# Patient Record
Sex: FEMALE | Race: WHITE | NOT HISPANIC OR LATINO | Employment: FULL TIME | URBAN - METROPOLITAN AREA
[De-identification: names, ages, dates, MRNs, and addresses within clinical notes are randomized per-mention and may not be internally consistent; named-entity substitution may affect disease eponyms.]

---

## 2017-02-27 ENCOUNTER — RADIANT APPOINTMENT (OUTPATIENT)
Dept: MAMMOGRAPHY | Facility: CLINIC | Age: 57
End: 2017-02-27
Payer: COMMERCIAL

## 2017-02-27 ENCOUNTER — OFFICE VISIT (OUTPATIENT)
Dept: OBGYN | Facility: CLINIC | Age: 57
End: 2017-02-27
Payer: COMMERCIAL

## 2017-02-27 VITALS
WEIGHT: 222 LBS | DIASTOLIC BLOOD PRESSURE: 72 MMHG | HEIGHT: 62 IN | SYSTOLIC BLOOD PRESSURE: 120 MMHG | BODY MASS INDEX: 40.85 KG/M2 | HEART RATE: 80 BPM

## 2017-02-27 DIAGNOSIS — Z01.419 ENCOUNTER FOR GYNECOLOGICAL EXAMINATION WITHOUT ABNORMAL FINDING: Primary | ICD-10-CM

## 2017-02-27 DIAGNOSIS — Z12.31 VISIT FOR SCREENING MAMMOGRAM: ICD-10-CM

## 2017-02-27 PROCEDURE — 99396 PREV VISIT EST AGE 40-64: CPT | Performed by: OBSTETRICS & GYNECOLOGY

## 2017-02-27 PROCEDURE — G0202 SCR MAMMO BI INCL CAD: HCPCS | Mod: TC

## 2017-02-27 PROCEDURE — G0145 SCR C/V CYTO,THINLAYER,RESCR: HCPCS | Performed by: OBSTETRICS & GYNECOLOGY

## 2017-02-27 RX ORDER — FUROSEMIDE 20 MG
20 TABLET ORAL
COMMUNITY
Start: 2017-02-07

## 2017-02-27 RX ORDER — METOPROLOL SUCCINATE 50 MG/1
50 TABLET, EXTENDED RELEASE ORAL
COMMUNITY
Start: 2016-07-29 | End: 2019-04-01

## 2017-02-27 RX ORDER — LORATADINE 10 MG/1
10 TABLET ORAL
COMMUNITY

## 2017-02-27 ASSESSMENT — ANXIETY QUESTIONNAIRES
5. BEING SO RESTLESS THAT IT IS HARD TO SIT STILL: SEVERAL DAYS
2. NOT BEING ABLE TO STOP OR CONTROL WORRYING: SEVERAL DAYS
3. WORRYING TOO MUCH ABOUT DIFFERENT THINGS: SEVERAL DAYS
GAD7 TOTAL SCORE: 5
6. BECOMING EASILY ANNOYED OR IRRITABLE: NOT AT ALL
1. FEELING NERVOUS, ANXIOUS, OR ON EDGE: SEVERAL DAYS
IF YOU CHECKED OFF ANY PROBLEMS ON THIS QUESTIONNAIRE, HOW DIFFICULT HAVE THESE PROBLEMS MADE IT FOR YOU TO DO YOUR WORK, TAKE CARE OF THINGS AT HOME, OR GET ALONG WITH OTHER PEOPLE: SOMEWHAT DIFFICULT
7. FEELING AFRAID AS IF SOMETHING AWFUL MIGHT HAPPEN: NOT AT ALL

## 2017-02-27 ASSESSMENT — PATIENT HEALTH QUESTIONNAIRE - PHQ9: 5. POOR APPETITE OR OVEREATING: SEVERAL DAYS

## 2017-02-27 NOTE — MR AVS SNAPSHOT
"              After Visit Summary   2017    Wendy Goltzman    MRN: [id number]           Patient Information     Date Of Birth          1960        Visit Information        Provider Department      2017 1:00 PM Perry Urbina MD [facility]        Today's Diagnoses     Encounter for gynecological examination without abnormal finding    -  1       Follow-ups after your visit        Who to contact     If you have questions or need follow up information about today's clinic visit or your schedule please contact [facility] directly at [phone].  Normal or non-critical lab and imaging results will be communicated to you by [employer], letter or phone within 4 business days after the clinic has received the results. If you do not hear from us within 7 days, please contact the clinic through [employer] or phone. If you have a critical or abnormal lab result, we will notify you by phone as soon as possible.  Submit refill requests through [employer] or call your pharmacy and they will forward the refill request to us. Please allow 3 business days for your refill to be completed.          Additional Information About Your Visit        MyChart Information     [employer] lets you send messages to your doctor, view your test results, renew your prescriptions, schedule appointments and more. To sign up, go to [email or web address] . Click on \"Log in\" on the left side of the screen, which will take you to the Welcome page. Then click on \"Sign up Now\" on the right side of the page.     You will be asked to enter the access code listed below, as well as some personal information. Please follow the directions to create your username and password.     Your access code is: [email or web address]  Expires: 2017  1:50 PM     Your access code will  in 90 days. If you need help or a new code, please call your [place] clinic or [phone].        Care EveryWhere ID     This is your " "Care EveryWhere ID. This could be used by other organizations to access your [place] medical records  [id number]        Your Vitals Were     Pulse Height Last Period Breastfeeding? BMI (Body Mass Index)       80 1.575 m (5' 2\") (LMP Unknown) No 40.6 kg/m2        Blood Pressure from Last 3 Encounters:   02/27/17 120/72   02/25/16 112/70   12/01/15 112/60    Weight from Last 3 Encounters:   02/27/17 100.7 kg (222 lb)   02/25/16 99.3 kg (219 lb)   12/01/15 102.8 kg (226 lb 9.6 oz)              We Performed the Following     Pap imaged thin layer screen reflex to HPV if ASCUS - recommended age 25 - 29 years        Primary Care Provider Office Phone # Fax #    Sherman Diaz [phone]       [place]  [facility] [address]        Thank you!     Thank you for choosing [facility] [place]  for your care. Our goal is always to provide you with excellent care. Hearing back from our patients is one way we can continue to improve our services. Please take a few minutes to complete the written survey that you may receive in the mail after your visit with us. Thank you!             Your Updated Medication List - Protect others around you: Learn how to safely use, store and throw away your medicines at www.disposemymeds.org.          This list is accurate as of: 2/27/17  1:50 PM.  Always use your most recent med list.                   Brand Name Dispense Instructions for use    furosemide 20 MG tablet    LASIX     Take 20 mg by mouth       ibuprofen 600 MG tablet    ADVIL/MOTRIN    30 tablet    Take 1 tablet (600 mg) by mouth every 6 hours as needed for pain       loratadine 10 MG tablet    CLARITIN     Take 10 mg by mouth       metoprolol 50 MG 24 hr tablet    TOPROL-XL     Take 50 mg by mouth       ZANTAC PO            "

## 2017-02-27 NOTE — PROGRESS NOTES
Brigida is a 56 year old  female who presents for annual exam.     Besides routine health maintenance,  she would like to discuss hormones.    HPI: The patient was seen at this time for her annual exam. She is very discouraged that she has regained all of the weight that she had lost. She is menopausal.  The patient's PCP is Sherman Diaz MD.        GYNECOLOGIC HISTORY:    No LMP recorded (lmp unknown). Patient is postmenopausal.  Her current contraception method is: menopause.  She  reports that she has never smoked. She does not have any smokeless tobacco history on file.    Patient is sexually active.  STD testing offered?  Declined  Last PHQ-9 score on record =   PHQ-9 SCORE 2017   Total Score 13     Last GAD7 score on record =   PILO-7 SCORE 2017   Total Score 5     Alcohol Score = 1    HEALTH MAINTENANCE:  Cholesterol: (No results found for: CHOL   Last Mammo: one year ago, Result: normal, Next Mammo: today   Pap: (  Lab Results   Component Value Date    PAP NIL 2016    PAP NIL 2015    )   Colonoscopy:  2012, Result: normal, Next Colonoscopy: 5 years.  Dexa:  2014    Health maintenance updated:  yes    HISTORY:  Obstetric History       T2      TAB0   SAB0   E0   M0   L0       # Outcome Date GA Lbr Rosendo/2nd Weight Sex Delivery Anes PTL Lv   2 Term            1 Term                   Patient Active Problem List   Diagnosis     Abnormal glandular Papanicolaou smear of cervix     Past Surgical History   Procedure Laterality Date     Laminectomy cervical minimally invasive one level       Mammoplasty reduction bilateral  2004     Tonsillectomy        Social History   Substance Use Topics     Smoking status: Never Smoker     Smokeless tobacco: Not on file     Alcohol use No      Problem (# of Occurrences) Relation (Name,Age of Onset)    Myocardial Infarction (1) Father: x2       Negative family history of: DIABETES            Current Outpatient Prescriptions  "  Medication Sig     metoprolol (TOPROL-XL) 50 MG 24 hr tablet Take 50 mg by mouth     furosemide (LASIX) 20 MG tablet Take 20 mg by mouth     Ranitidine HCl (ZANTAC PO)      loratadine (CLARITIN) 10 MG tablet Take 10 mg by mouth     ibuprofen (ADVIL,MOTRIN) 600 MG tablet Take 1 tablet (600 mg) by mouth every 6 hours as needed for pain     No current facility-administered medications for this visit.      Allergies   Allergen Reactions     Sulfa Drugs        Past medical, surgical, social and family histories were reviewed and updated in EPIC.    ROS:   12 point review of systems negative other than symptoms noted below.  Constitutional: Fatigue and Weight Gain  Eyes: Spots  Head: Ringing  Cardiovascular: Lower Extremity Swelling  Gastrointestinal: Bloating and Heartburn  Genitourinary: No Periods and Urgency  Skin: Rash  Musculoskeletal: Muscle Cramps  Endocrine: Cold Intolerance  Psychiatric: Depression    EXAM:  /72  Pulse 80  Ht 5' 2\" (1.575 m)  Wt 222 lb (100.7 kg)  LMP  (LMP Unknown)  Breastfeeding? No  BMI 40.6 kg/m2   BMI: Body mass index is 40.6 kg/(m^2).    PHYSICAL EXAM:  Constitutional:  Appearance: Well nourished, well developed, alert, in no acute distress  Neck:  Lymph Nodes:  No lymphadenopathy present    Thyroid:  Gland size normal, nontender, no nodules or masses present  on palpation  Chest:  Respiratory Effort:  Breathing unlabored  Cardiovascular:    Heart: Auscultation:  Regular rate, normal rhythm, no murmurs present  Breasts: Inspection of Breasts:  No lymphadenopathy present    Palpation of Breasts and Axillae:  No masses present on palpation, no  breast tenderness    Axillary Lymph Nodes:  No lymphadenopathy present  Gastrointestinal:   Abdominal Examination:  Abdomen nontender to palpation, tone normal without rigidity or guarding, no masses present, umbilicus without lesions   Liver and Spleen:  No hepatomegaly present, liver nontender to palpation    Hernias:  No hernias " present  Lymphatic: Lymph Nodes:  No other lymphadenopathy present  Skin:  General Inspection:  No rashes present, no lesions present, no areas of  discoloration    Genitalia and Groin:  No rashes present, no lesions present, no areas of  discoloration, no masses present  Neurologic/Psychiatric:    Mental Status:  Oriented X3     Pelvic Exam:  External Genitalia:     Normal appearance for age, no discharge present, no tenderness present, no inflammatory lesions present, color normal  Vagina:     Normal vaginal vault without central or paravaginal defects, no discharge present, no inflammatory lesions present, no masses present  Bladder:     Nontender to palpation  Urethra:   Urethral Body:  Urethra palpation normal, urethra structural support normal   Urethral Meatus:  No erythema or lesions present  Cervix:     Appearance healthy, no lesions present, nontender to palpation, no bleeding present  Uterus:     Nontender to palpation, no masses present, position anteflexed, mobility: normal  Adnexa:     No adnexal tenderness present, no adnexal masses present  Perineum:     Perineum within normal limits, no evidence of trauma, no rashes or skin lesions present  Anus:     Anus within normal limits, no hemorrhoids present  Inguinal Lymph Nodes:     No lymphadenopathy present  Pubic Hair:     Normal pubic hair distribution for age  Genitalia and Groin:     No rashes present, no lesions present, no areas of discoloration, no masses present    COUNSELING:   Reviewed preventive health counseling, as reflected in patient instructions       Regular exercise       Healthy diet/nutrition    BMI: Body mass index is 40.6 kg/(m^2).  Weight management plan: Discussed healthy diet and exercise guidelines and patient will follow up in 6 months in clinic to re-evaluate.    ASSESSMENT:  56 year old female with satisfactory annual exam.    ICD-10-CM    1. Encounter for gynecological examination without abnormal finding Z01.419         PLAN: Patient's annual examination is adequate. She needs to drop at least 50 pounds.      Perry Urbina MD

## 2017-02-27 NOTE — LETTER
[facility]  [address] , [address]  [place]   [address] [phone]      3/1/2017     Wendy Goltzman   [address] [address]      Dear Brigida,  We are happy to inform you that your PAP smear result is normal.  We are now able to do a follow up test on PAP smears. The DNA test is for HPV (Human Papilloma Virus). Cervical cancer is closely linked with certain types of HPV. Your result showed no evidence of HPV.  Therefore we recommend you return in 1 year for your next pap smear.  You will still need to return to the clinic every year for an annual exam and other preventive tests.  Please contact the clinic with any questions.  Sincerely,    Perry Perez MD

## 2017-02-28 ASSESSMENT — ANXIETY QUESTIONNAIRES: GAD7 TOTAL SCORE: 5

## 2017-02-28 ASSESSMENT — PATIENT HEALTH QUESTIONNAIRE - PHQ9: SUM OF ALL RESPONSES TO PHQ QUESTIONS 1-9: 13

## 2017-03-01 LAB
COPATH REPORT: NORMAL
PAP: NORMAL

## 2017-03-31 ENCOUNTER — TELEPHONE (OUTPATIENT)
Dept: OBGYN | Facility: CLINIC | Age: 57
End: 2017-03-31

## 2017-03-31 NOTE — TELEPHONE ENCOUNTER
LM informing pt if she has not had a period on over a year and experience vaginal bleeding she should call back to schedule an appt. Otherwise pt should call to discuss symptoms with triage nurse in further detail.

## 2017-03-31 NOTE — TELEPHONE ENCOUNTER
Patient has unexpected bleeding in started on 03/20/17 ended on 03/25/17. Patient hasn't had this for over year and would like to speak to a nurse.

## 2017-04-03 ENCOUNTER — TELEPHONE (OUTPATIENT)
Dept: NURSING | Facility: CLINIC | Age: 57
End: 2017-04-03

## 2017-04-03 NOTE — TELEPHONE ENCOUNTER
"Pt calling before her annual on 2/25/16 it had been a year since her last period and on 3/20/17 she had another period but it didn't last as long and did have some cramping, passed small, about pea size blood clots. Three wks before the period at the end of March she had \"felt odd\" in the pelvic area and when she urinated thought something solid was coming out of bladder but with the period that she had now thinks that it was vaginally. No history of fibroid and polyps, thinks when really young had a cyst. In between that timeframe of her no period time frame, she had brown/old blood spotting and it never occurred again. Hasn't been keep track of dating with these occurrences. Denies any menopausal sx's: no hot flashes, no night sweats, not on any hormones. Period not present at the moment. Recommended to come in for an appt with Dr. Urbina to evaluate and investigate further. If bleeding comes back or cramping to let us know. Pt verbalized understanding, no further questions, and transferred to scheduled to make appt for this week.      Closing encounter.  "

## 2017-04-05 ENCOUNTER — OFFICE VISIT (OUTPATIENT)
Dept: OBGYN | Facility: CLINIC | Age: 57
End: 2017-04-05
Payer: COMMERCIAL

## 2017-04-05 ENCOUNTER — RADIANT APPOINTMENT (OUTPATIENT)
Dept: ULTRASOUND IMAGING | Facility: CLINIC | Age: 57
End: 2017-04-05
Payer: COMMERCIAL

## 2017-04-05 VITALS
HEIGHT: 62 IN | BODY MASS INDEX: 40.85 KG/M2 | WEIGHT: 222 LBS | DIASTOLIC BLOOD PRESSURE: 80 MMHG | SYSTOLIC BLOOD PRESSURE: 130 MMHG

## 2017-04-05 DIAGNOSIS — N95.0 POSTMENOPAUSAL BLEEDING: ICD-10-CM

## 2017-04-05 DIAGNOSIS — N95.0 POSTMENOPAUSAL BLEEDING: Primary | ICD-10-CM

## 2017-04-05 LAB — FSH SERPL-ACNC: 58.6 IU/L

## 2017-04-05 PROCEDURE — 99213 OFFICE O/P EST LOW 20 MIN: CPT | Mod: 25 | Performed by: OBSTETRICS & GYNECOLOGY

## 2017-04-05 PROCEDURE — 83001 ASSAY OF GONADOTROPIN (FSH): CPT | Performed by: OBSTETRICS & GYNECOLOGY

## 2017-04-05 PROCEDURE — 36415 COLL VENOUS BLD VENIPUNCTURE: CPT | Performed by: OBSTETRICS & GYNECOLOGY

## 2017-04-05 PROCEDURE — 76830 TRANSVAGINAL US NON-OB: CPT | Performed by: OBSTETRICS & GYNECOLOGY

## 2017-04-05 NOTE — PROGRESS NOTES
SUBJECTIVE:                                                   Wendy Goltzman is a 56 year old female who presents to clinic today for the following health issue(s):  Patient presents with:  Abnormal Uterine Bleeding: PMB from 3/20-3/25      HPI: The patient is seen at this time for an episode of bleeding that lasted for 5 days. She felt that it been over a year since she had had any menses at all. There was no associated change in exercise or sexual activity. She has not noted any change in breast activity.      No LMP recorded (lmp unknown). Patient is postmenopausal..   Patient is sexually active, .  Using menopause for contraception.    reports that she has never smoked. She does not have any smokeless tobacco history on file.    STD testing offered?  Declined    Health maintenance updated:  yes    Today's PHQ-2 Score:   PHQ-2 (  [employer]) 2016   Q1: Little interest or pleasure in doing things 0   Q2: Feeling down, depressed or hopeless 0   PHQ-2 Score 0     Today's PHQ-9 Score:   PHQ-9 SCORE 2017   Total Score 13     Today's PILO-7 Score:   PILO-7 SCORE 2017   Total Score 5       Problem list and histories reviewed & adjusted, as indicated.  Additional history: as documented.    Patient Active Problem List   Diagnosis     Abnormal glandular Papanicolaou smear of cervix     Past Surgical History:   Procedure Laterality Date     LAMINECTOMY CERVICAL MINIMALLY INVASIVE ONE LEVEL       MAMMOPLASTY REDUCTION BILATERAL  2004     TONSILLECTOMY        Social History   Substance Use Topics     Smoking status: Never Smoker     Smokeless tobacco: Not on file     Alcohol use No      Problem (# of Occurrences) Relation (Name,Age of Onset)    Myocardial Infarction (1) Father: x2       Negative family history of: DIABETES            Current Outpatient Prescriptions   Medication Sig     metoprolol (TOPROL-XL) 50 MG 24 hr tablet Take 50 mg by mouth     furosemide (LASIX) 20 MG tablet Take 20 mg by mouth  "    loratadine (CLARITIN) 10 MG tablet Take 10 mg by mouth     Ranitidine HCl (ZANTAC PO)      ibuprofen (ADVIL,MOTRIN) 600 MG tablet Take 1 tablet (600 mg) by mouth every 6 hours as needed for pain     No current facility-administered medications for this visit.      Allergies   Allergen Reactions     Sulfa Drugs        ROS:  Genitourinary: Irregular Menses    OBJECTIVE:     /80  Ht 5' 2\" (1.575 m)  Wt 222 lb (100.7 kg)  LMP  (LMP Unknown)  Breastfeeding? No  BMI 40.6 kg/m2  Body mass index is 40.6 kg/(m^2).    Exam:  Constitutional:  Appearance: Well nourished, well developed alert, in no acute distress  Gastrointestinal:  Abdominal Examination:  Abdomen nontender to palpation, tone normal without rigidity or guarding, no masses present, umbilicus without lesions; Liver/Spleen:  No hepatomegaly present, liver nontender to palpation; Hernias:  No hernias present  Lymphatic: Lymph Nodes:  No other lymphadenopathy present  Skin:General Inspection:  No rashes present, no lesions present, no areas of discoloration; Genitalia and Groin:  No rashes present, no lesions present, no areas of discoloration, no masses present.  Neurologic/Psychiatric:  Mental Status:  Oriented X3   Pelvic Exam:  External Genitalia:     Normal appearance for age, no discharge present, no tenderness present, no inflammatory lesions present, color normal  Vagina:     Normal vaginal vault without central or paravaginal defects, no discharge present, no inflammatory lesions present, no masses present  Bladder:     Nontender to palpation  Urethra:   Urethral Body:  Urethra palpation normal, urethra structural support normal   Urethral Meatus:  No erythema or lesions present  Cervix:     Appearance healthy, no lesions present, nontender to palpation, no bleeding present  Uterus:     Nontender to palpation, no masses present, position anteflexed, mobility: normal  Adnexa:     No adnexal tenderness present, no adnexal masses " present  Perineum:     Perineum within normal limits, no evidence of trauma, no rashes or skin lesions present  Anus:     Anus within normal limits, no hemorrhoids present  Inguinal Lymph Nodes:     No lymphadenopathy present  Pubic Hair:     Normal pubic hair distribution for age  Genitalia and Groin:     No rashes present, no lesions present, no areas of discoloration, no masses present     In-Clinic Test Results: Vaginal probe ultrasound showed a thin endometrial lining with no polyps and normal small ovaries.      ASSESSMENT/PLAN:                                                        Patient with abnormal bleeding that potentially could be postmenopausal. We will draw an FSH. If it is still in the normal range we will write this off to menses. If she is menopausal she will need a sampling of the endometrium even though it is then.        Perry Urbina MD  [facility] [employer]

## 2017-04-05 NOTE — MR AVS SNAPSHOT
"              After Visit Summary   2017    Wendy Goltzman    MRN: [id number]           Patient Information     Date Of Birth          1960        Visit Information        Provider Department      2017 10:45 AM Perry Urbina MD [facility]        Today's Diagnoses     Postmenopausal bleeding    -  1       Follow-ups after your visit        Who to contact     If you have questions or need follow up information about today's clinic visit or your schedule please contact [facility] directly at [phone].  Normal or non-critical lab and imaging results will be communicated to you by [email or web address], letter or phone within 4 business days after the clinic has received the results. If you do not hear from us within 7 days, please contact the clinic through [email or web address] or phone. If you have a critical or abnormal lab result, we will notify you by phone as soon as possible.  Submit refill requests through [employer] or call your pharmacy and they will forward the refill request to us. Please allow 3 business days for your refill to be completed.          Additional Information About Your Visit        MyChart Information     [employer] lets you send messages to your doctor, view your test results, renew your prescriptions, schedule appointments and more. To sign up, go to [email or web address] . Click on \"Log in\" on the left side of the screen, which will take you to the Welcome page. Then click on \"Sign up Now\" on the right side of the page.     You will be asked to enter the access code listed below, as well as some personal information. Please follow the directions to create your username and password.     Your access code is: [email or web address]  Expires: 2017  2:50 PM     Your access code will  in 90 days. If you need help or a new code, please call your [facility] or [phone].        Care EveryWhere ID     This is your Care EveryWhere ID. This could be used by " "other organizations to access your [place] medical records  [id number]        Your Vitals Were     Height Last Period Breastfeeding? BMI (Body Mass Index)          5' 2\" (1.575 m) (LMP Unknown) No 40.6 kg/m2         Blood Pressure from Last 3 Encounters:   04/05/17 130/80   02/27/17 120/72   02/25/16 112/70    Weight from Last 3 Encounters:   04/05/17 222 lb (100.7 kg)   02/27/17 222 lb (100.7 kg)   02/25/16 219 lb (99.3 kg)              We Performed the Following     Follicle stimulating hormone        Primary Care Provider Office Phone # Fax #    Sherman Janis Diaz [phone]       [address]  [address]  [facility] [address]        Thank you!     Thank you for choosing [facility]  for your care. Our goal is always to provide you with excellent care. Hearing back from our patients is one way we can continue to improve our services. Please take a few minutes to complete the written survey that you may receive in the mail after your visit with us. Thank you!             Your Updated Medication List - Protect others around you: Learn how to safely use, store and throw away your medicines at www.disposemymeds.org.          This list is accurate as of: 4/5/17 11:57 AM.  Always use your most recent med list.                   Brand Name Dispense Instructions for use    furosemide 20 MG tablet    LASIX     Take 20 mg by mouth       ibuprofen 600 MG tablet    ADVIL/MOTRIN    30 tablet    Take 1 tablet (600 mg) by mouth every 6 hours as needed for pain       loratadine 10 MG tablet    CLARITIN     Take 10 mg by mouth       metoprolol 50 MG 24 hr tablet    TOPROL-XL     Take 50 mg by mouth       ZANTAC PO            "

## 2017-04-11 ENCOUNTER — TELEPHONE (OUTPATIENT)
Dept: OBGYN | Facility: CLINIC | Age: 57
End: 2017-04-11

## 2017-04-11 NOTE — TELEPHONE ENCOUNTER
Reason for Call:  Other call back    Detailed comments: Would like her lab results    Phone Number Patient can be reached at: Home number on file [phone] (home)    Best Time: Anytime    Can we leave a detailed message on this number? YES    Call taken on 4/11/2017 at 11:31 AM by Little Patel

## 2017-04-18 ENCOUNTER — OFFICE VISIT (OUTPATIENT)
Dept: OBGYN | Facility: CLINIC | Age: 57
End: 2017-04-18
Payer: COMMERCIAL

## 2017-04-18 VITALS
HEIGHT: 62 IN | DIASTOLIC BLOOD PRESSURE: 78 MMHG | SYSTOLIC BLOOD PRESSURE: 110 MMHG | WEIGHT: 222 LBS | BODY MASS INDEX: 40.85 KG/M2

## 2017-04-18 DIAGNOSIS — N95.0 POSTMENOPAUSAL BLEEDING: Primary | ICD-10-CM

## 2017-04-18 PROCEDURE — 99212 OFFICE O/P EST SF 10 MIN: CPT | Performed by: OBSTETRICS & GYNECOLOGY

## 2017-04-18 NOTE — PROGRESS NOTES
The patient is seen at this time for postmenopausal bleeding. Her ultrasound showed a normal-sized uterus and a very thin endometrial lining. There was no sign of polyp or myoma. We discussed the option of endometrial biopsy. Examination showed that her cervix was very high and irregular. There was no way that an endometrial biopsy was going to be attainable in the office. If she bleeds again she will need to go to the hospital for a hysteroscopy and D&C. She will contact us if she bleeds.

## 2017-04-18 NOTE — MR AVS SNAPSHOT
"              After Visit Summary   2017    Wendy Goltzman    MRN: [id number]           Patient Information     Date Of Birth          1960        Visit Information        Provider Department      2017 1:15 PM Perry Urbina MD [facility]         Follow-ups after your visit        Who to contact     If you have questions or need follow up information about today's clinic visit or your schedule please contact [facility] directly at [phone].  Normal or non-critical lab and imaging results will be communicated to you by MyChart, letter or phone within 4 business days after the clinic has received the results. If you do not hear from us within 7 days, please contact the clinic through [employer] or phone. If you have a critical or abnormal lab result, we will notify you by phone as soon as possible.  Submit refill requests through [employer] or call your pharmacy and they will forward the refill request to us. Please allow 3 business days for your refill to be completed.          Additional Information About Your Visit        MyChart Information     [employer] lets you send messages to your doctor, view your test results, renew your prescriptions, schedule appointments and more. To sign up, go to [email or web address] . Click on \"Log in\" on the left side of the screen, which will take you to the Welcome page. Then click on \"Sign up Now\" on the right side of the page.     You will be asked to enter the access code listed below, as well as some personal information. Please follow the directions to create your username and password.     Your access code is: [email or web address]  Expires: 2017  2:50 PM     Your access code will  in 90 days. If you need help or a new code, please call your [place] clinic or [phone].        Care EveryWhere ID     This is your Care EveryWhere ID. This could be used by other organizations to access your [place] medical " "records  [id number]        Your Vitals Were     Height Last Period BMI (Body Mass Index)             5' 2\" (1.575 m) (LMP Unknown) 40.6 kg/m2          Blood Pressure from Last 3 Encounters:   04/18/17 110/78   04/05/17 130/80   02/27/17 120/72    Weight from Last 3 Encounters:   04/18/17 222 lb (100.7 kg)   04/05/17 222 lb (100.7 kg)   02/27/17 222 lb (100.7 kg)              Today, you had the following     No orders found for display         Today's Medication Changes          These changes are accurate as of: 4/18/17  1:40 PM.  If you have any questions, ask your nurse or doctor.               Stop taking these medicines if you haven't already. Please contact your care team if you have questions.     ibuprofen 600 MG tablet   Commonly known as:  ADVIL/MOTRIN   Stopped by:  Perry Urbina MD                    Primary Care Provider Office Phone # Fax #    Sherman Janis Diaz [phone]       [facility] [employer]  [address]  [facility] [address]        Thank you!     Thank you for choosing [facility] FOR [facility]  for your care. Our goal is always to provide you with excellent care. Hearing back from our patients is one way we can continue to improve our services. Please take a few minutes to complete the written survey that you may receive in the mail after your visit with us. Thank you!             Your Updated Medication List - Protect others around you: Learn how to safely use, store and throw away your medicines at www.disposemymeds.org.          This list is accurate as of: 4/18/17  1:40 PM.  Always use your most recent med list.                   Brand Name Dispense Instructions for use    furosemide 20 MG tablet    LASIX     Take 20 mg by mouth       loratadine 10 MG tablet    CLARITIN     Take 10 mg by mouth       metoprolol 50 MG 24 hr tablet    TOPROL-XL     Take 50 mg by mouth       ZANTAC PO            "

## 2017-07-11 ENCOUNTER — TELEPHONE (OUTPATIENT)
Dept: NURSING | Facility: CLINIC | Age: 57
End: 2017-07-11

## 2017-07-11 NOTE — TELEPHONE ENCOUNTER
4/18/17 postmenopausal bleeding.  Pt states Dr. Urbina stated if pt bleeds again pt will need a biopsy. Pt states she has had brown spotting a couple of times. Spotting is small amount. Last spotting occurred today. Before that she had spotting about two weeks ago. Discharge on tissue with wiping and in toilet after voiding. Routing to Jacqueline Rhodes. Please advise.     4/18/17 If she bleeds again she will need to go to the hospital for a hysteroscopy and D&C. She will contact us if she bleeds.

## 2017-07-11 NOTE — TELEPHONE ENCOUNTER
Spoke with EB. Pt should speak with Evelyn in surgery scheduling. She needs a Hysteroscopy and D&C with EB. She also needs a preop with him.

## 2017-07-12 ENCOUNTER — TELEPHONE (OUTPATIENT)
Dept: OBGYN | Facility: CLINIC | Age: 57
End: 2017-07-12

## 2017-07-12 NOTE — TELEPHONE ENCOUNTER
FROM VERBAL AS NO SX REQUEST IN CHART    HSC D&C   DX PMB    Patient surgery scheduled on 7/25/2017 at 9:30am Check in 7:30am  Location for surgery to performed:   Surgery Outpatient  Scheduled by [place] 7/12/2017     Information Packet given :Yes: MAILED 7/12/2017    CPT codes given: Yes    16645        Consents signed? N/A  Rep Informed :N/A    PREOP DATE :  7/24/2017  In Epic :Yes    On Spreadsheet :Yes    On Calendar EB  :Yes    In [place] Calendar GEORGETTE  :No    Assist NA   Assist in Epic NA  Assist Notified as needed :No     Evelyn Marin  Surgery Scheduler

## 2017-07-12 NOTE — LETTER
July 20, 2017    Wendy Goltzman                                                                                                                     [address] [facility] [address]      Dear Brigida,    We have made effort to obtain an accurate quote from your insurance company based on the information we have on file. Your actual coverage may differ. [facility] can NOT guarantee coverage. We recommend that if you have questions regarding coverage to call your insurance company. Our billing department is happy to assist you with your insurance claim but you are responsible for all services rendered whether or not they are paid by your insurance provider. Again, this is not a guarantee of benefits just a notice of the information they have given to us.     Insurance Co [employer] Phone # [phone]  ID [id number]         Group [id number]  Evelyn haines w/ [place]  on 7/20/2017    Policy Eff Date 1/1/2014 and current Yes  CoInsurance (covered at) 80% after deductible has been met.  Deductible $3800.00 met to date $3800.00  MOOP $7600.00 Family met to date $5541.22 Family  CoPay NA  Prior Auth Required:  No  Pre Existing Condition No  Surgeon YANCI Urbina In Network Yes  Hospital [facility] In Network Yes  Referral Needed:  No.  Mailed to Patient Yes                  If No Document why by date                  If Yes                                   Date 7/20/2017      Sincerely,         Evelyn Marin  Surgery Scheduler

## 2017-07-24 ENCOUNTER — OFFICE VISIT (OUTPATIENT)
Dept: OBGYN | Facility: CLINIC | Age: 57
End: 2017-07-24
Payer: COMMERCIAL

## 2017-07-24 VITALS
BODY MASS INDEX: 42.14 KG/M2 | HEIGHT: 62 IN | WEIGHT: 229 LBS | DIASTOLIC BLOOD PRESSURE: 68 MMHG | SYSTOLIC BLOOD PRESSURE: 122 MMHG

## 2017-07-24 DIAGNOSIS — Z01.812 PRE-OPERATIVE LABORATORY EXAMINATION: ICD-10-CM

## 2017-07-24 DIAGNOSIS — N95.0 POSTMENOPAUSAL BLEEDING: ICD-10-CM

## 2017-07-24 DIAGNOSIS — N95.0 POSTMENOPAUSAL BLEEDING: Primary | ICD-10-CM

## 2017-07-24 LAB — HGB BLD-MCNC: 12.4 G/DL (ref 11.7–15.7)

## 2017-07-24 PROCEDURE — 36415 COLL VENOUS BLD VENIPUNCTURE: CPT | Performed by: OBSTETRICS & GYNECOLOGY

## 2017-07-24 PROCEDURE — 99214 OFFICE O/P EST MOD 30 MIN: CPT | Performed by: OBSTETRICS & GYNECOLOGY

## 2017-07-24 PROCEDURE — 85018 HEMOGLOBIN: CPT | Performed by: OBSTETRICS & GYNECOLOGY

## 2017-07-24 RX ORDER — PHENAZOPYRIDINE HYDROCHLORIDE 200 MG/1
200 TABLET, FILM COATED ORAL ONCE
Status: CANCELLED | OUTPATIENT
Start: 2017-07-24 | End: 2017-07-24

## 2017-07-24 NOTE — H&P (VIEW-ONLY)
SUBJECTIVE:                                                   Wendy Goltzman is a 56 year old female who presents to clinic today for the following health issue(s):  Patient presents with:  Pre-Op Exam: Has D & C hysteroscopy scheduled for tomorrow (17)      HPI: The patient is seen at this time for preoperative clearance for a hysteroscopy for heavy bleeding. We anticipate a hysteroscopy with polypectomy and curettage and endometrial ablation. She understands risks and complications including general anesthesia blood loss infection injury to bowel bladder or ureters and major vessels. She denies any recent infections. She is stable on all of her medications.      No LMP recorded (lmp unknown). Patient is postmenopausal..   Patient is sexually active, .  Using menopause for contraception.    reports that she has never smoked. She has never used smokeless tobacco.      STD testing offered?  Declined    Health maintenance updated:  yes    Today's PHQ-2 Score: PHQ-2 (  [employer]) 2016   Q1: Little interest or pleasure in doing things 0   Q2: Feeling down, depressed or hopeless 0   PHQ-2 Score 0     Today's PHQ-9 Score:   PHQ-9 SCORE 2017   Total Score 13     Today's PILO-7 Score:   PILO-7 SCORE 2017   Total Score 5       Problem list and histories reviewed & adjusted, as indicated.  Additional history: as documented.    Patient Active Problem List   Diagnosis     Abnormal glandular Papanicolaou smear of cervix     Past Surgical History:   Procedure Laterality Date     LAMINECTOMY CERVICAL MINIMALLY INVASIVE ONE LEVEL       MAMMOPLASTY REDUCTION BILATERAL  2004     TONSILLECTOMY        Social History   Substance Use Topics     Smoking status: Never Smoker     Smokeless tobacco: Never Used     Alcohol use No      Problem (# of Occurrences) Relation (Name,Age of Onset)    Myocardial Infarction (1) Father: x2       Negative family history of: DIABETES            Current Outpatient  "Prescriptions   Medication Sig     metoprolol (TOPROL-XL) 50 MG 24 hr tablet Take 50 mg by mouth     furosemide (LASIX) 20 MG tablet Take 20 mg by mouth     loratadine (CLARITIN) 10 MG tablet Take 10 mg by mouth     Ranitidine HCl (ZANTAC PO)      No current facility-administered medications for this visit.      Allergies   Allergen Reactions     Sulfa Drugs        ROS:  12 point review of systems negative other than symptoms noted below.  Eyes: Spots  Head: Ringing  Cardiovascular: Lower Extremity Swelling  Gastrointestinal: Bloating and Heartburn  Genitourinary: Irregular Menses, Night Sweats and Spotting  Psychiatric: Depression    OBJECTIVE:     /68  Ht 5' 2\" (1.575 m)  Wt 229 lb (103.9 kg)  LMP  (LMP Unknown)  BMI 41.88 kg/m2  Body mass index is 41.88 kg/(m^2).    Exam:  Constitutional:  Appearance: Well nourished, well developed alert, in no acute distress  Neck:  Lymph Nodes:  No lymphadenopathy present; Thyroid:  Gland size normal, nontender, no nodules or masses present on palpation  Chest:  Respiratory Effort:  Breathing unlabored  Cardiovascular: Heart: Auscultation:  Regular rate, normal rhythm, no murmurs present  Gastrointestinal:  Abdominal Examination:  Abdomen nontender to palpation, tone normal without rigidity or guarding, no masses present, umbilicus without lesions; Liver/Spleen:  No hepatomegaly present, liver nontender to palpation; Hernias:  No hernias present  Lymphatic: Lymph Nodes:  No other lymphadenopathy present  Skin:General Inspection:  No rashes present, no lesions present, no areas of discoloration; Genitalia and Groin:  No rashes present, no lesions present, no areas of discoloration, no masses present.  Neurologic/Psychiatric:  Mental Status:  Oriented X3   No Pelvic Exam performed     In-Clinic Test Results:  Results for orders placed or performed in visit on 07/24/17 (from the past 24 hour(s))   Hemoglobin   Result Value Ref Range    Hemoglobin 12.4 11.7 - 15.7 g/dL "       ASSESSMENT/PLAN:                                                        Patient with postmenopausal bleeding and a very high irregular cervix. This did not allow us to do an endometrial biopsy. Her ultrasound has not shown any polyps or sign of cancer. She is admitted this time for examination under anesthesia cervical dilatation for stenosis hysteroscopy and curettage and probable endometrial ablation. She is well appraised of the risks and complications of the procedure. We will proceed as directed. We reviewed her medications and she will hold all and they can be taken later in the day.        Perry Urbina MD  [facility] [place]

## 2017-07-24 NOTE — PROGRESS NOTES
SUBJECTIVE:                                                   Wendy Goltzman is a 56 year old female who presents to clinic today for the following health issue(s):  Patient presents with:  Pre-Op Exam: Has D & C hysteroscopy scheduled for tomorrow (17)      HPI: The patient is seen at this time for preoperative clearance for a hysteroscopy for heavy bleeding. We anticipate a hysteroscopy with polypectomy and curettage and endometrial ablation. She understands risks and complications including general anesthesia blood loss infection injury to bowel bladder or ureters and major vessels. She denies any recent infections. She is stable on all of her medications.      No LMP recorded (lmp unknown). Patient is postmenopausal..   Patient is sexually active, .  Using menopause for contraception.    reports that she has never smoked. She has never used smokeless tobacco.      STD testing offered?  Declined    Health maintenance updated:  yes    Today's PHQ-2 Score: PHQ-2 (  [employer]) 2016   Q1: Little interest or pleasure in doing things 0   Q2: Feeling down, depressed or hopeless 0   PHQ-2 Score 0     Today's PHQ-9 Score:   PHQ-9 SCORE 2017   Total Score 13     Today's PILO-7 Score:   PILO-7 SCORE 2017   Total Score 5       Problem list and histories reviewed & adjusted, as indicated.  Additional history: as documented.    Patient Active Problem List   Diagnosis     Abnormal glandular Papanicolaou smear of cervix     Past Surgical History:   Procedure Laterality Date     LAMINECTOMY CERVICAL MINIMALLY INVASIVE ONE LEVEL       MAMMOPLASTY REDUCTION BILATERAL  2004     TONSILLECTOMY        Social History   Substance Use Topics     Smoking status: Never Smoker     Smokeless tobacco: Never Used     Alcohol use No      Problem (# of Occurrences) Relation (Name,Age of Onset)    Myocardial Infarction (1) Father: x2       Negative family history of: DIABETES            Current Outpatient  "Prescriptions   Medication Sig     metoprolol (TOPROL-XL) 50 MG 24 hr tablet Take 50 mg by mouth     furosemide (LASIX) 20 MG tablet Take 20 mg by mouth     loratadine (CLARITIN) 10 MG tablet Take 10 mg by mouth     Ranitidine HCl (ZANTAC PO)      No current facility-administered medications for this visit.      Allergies   Allergen Reactions     Sulfa Drugs        ROS:  12 point review of systems negative other than symptoms noted below.  Eyes: Spots  Head: Ringing  Cardiovascular: Lower Extremity Swelling  Gastrointestinal: Bloating and Heartburn  Genitourinary: Irregular Menses, Night Sweats and Spotting  Psychiatric: Depression    OBJECTIVE:     /68  Ht 5' 2\" (1.575 m)  Wt 229 lb (103.9 kg)  LMP  (LMP Unknown)  BMI 41.88 kg/m2  Body mass index is 41.88 kg/(m^2).    Exam:  Constitutional:  Appearance: Well nourished, well developed alert, in no acute distress  Neck:  Lymph Nodes:  No lymphadenopathy present; Thyroid:  Gland size normal, nontender, no nodules or masses present on palpation  Chest:  Respiratory Effort:  Breathing unlabored  Cardiovascular: Heart: Auscultation:  Regular rate, normal rhythm, no murmurs present  Gastrointestinal:  Abdominal Examination:  Abdomen nontender to palpation, tone normal without rigidity or guarding, no masses present, umbilicus without lesions; Liver/Spleen:  No hepatomegaly present, liver nontender to palpation; Hernias:  No hernias present  Lymphatic: Lymph Nodes:  No other lymphadenopathy present  Skin:General Inspection:  No rashes present, no lesions present, no areas of discoloration; Genitalia and Groin:  No rashes present, no lesions present, no areas of discoloration, no masses present.  Neurologic/Psychiatric:  Mental Status:  Oriented X3   No Pelvic Exam performed     In-Clinic Test Results:  Results for orders placed or performed in visit on 07/24/17 (from the past 24 hour(s))   Hemoglobin   Result Value Ref Range    Hemoglobin 12.4 11.7 - 15.7 g/dL "       ASSESSMENT/PLAN:                                                        Patient with postmenopausal bleeding and a very high irregular cervix. This did not allow us to do an endometrial biopsy. Her ultrasound has not shown any polyps or sign of cancer. She is admitted this time for examination under anesthesia cervical dilatation for stenosis hysteroscopy and curettage and probable endometrial ablation. She is well appraised of the risks and complications of the procedure. We will proceed as directed. We reviewed her medications and she will hold all and they can be taken later in the day.        Perry Urbina MD  [facility] [place]

## 2017-07-24 NOTE — MR AVS SNAPSHOT
"              After Visit Summary   7/24/2017    Wendy Goltzman    MRN: [id number]           Patient Information     Date Of Birth          1960        Visit Information        Provider Department      7/24/2017 11:45 AM Perry Urbina MD [facility]        Today's Diagnoses     Postmenopausal bleeding    -  1       Follow-ups after your visit        Your next 10 appointments already scheduled     Jul 25, 2017   Procedure with Perry Urbina MD   [facility] (--)    [address]  [facility] [id number] [phone]              Who to contact     If you have questions or need follow up information about today's clinic visit or your schedule please contact [facility] directly at [phone].  Normal or non-critical lab and imaging results will be communicated to you by MyChart, letter or phone within 4 business days after the clinic has received the results. If you do not hear from us within 7 days, please contact the clinic through MyChart or phone. If you have a critical or abnormal lab result, we will notify you by phone as soon as possible.  Submit refill requests through [employer] or call your pharmacy and they will forward the refill request to us. Please allow 3 business days for your refill to be completed.          Additional Information About Your Visit        MyChart Information     [employer]game lets you send messages to your doctor, view your test results, renew your prescriptions, schedule appointments and more. To sign up, go to [email or web address] . Click on \"Log in\" on the left side of the screen, which will take you to the Welcome page. Then click on \"Sign up Now\" on the right side of the page.     You will be asked to enter the access code listed below, as well as some personal information. Please follow the directions to create your username and password.     Your access code is: [email or web address]  Expires: 10/22/2017 " "11:59 AM     Your access code will  in 90 days. If you need help or a new code, please call your [place] clinic or [phone].        Care EveryWhere ID     This is your Care EveryWhere ID. This could be used by other organizations to access your [place] medical records  [id number]        Your Vitals Were     Height Last Period BMI (Body Mass Index)             5' 2\" (1.575 m) (LMP Unknown) 41.88 kg/m2          Blood Pressure from Last 3 Encounters:   17 122/68   17 110/78   17 130/80    Weight from Last 3 Encounters:   17 229 lb (103.9 kg)   17 222 lb (100.7 kg)   17 222 lb (100.7 kg)              Today, you had the following     No orders found for display       Primary Care Provider Office Phone # Fax #    Delfinadane Garcia [phone]       [facility] [address]  [facility] [address]        Equal Access to Services     [facility]: Hadii aad ku hadasho Soomaali, waaxda luqadaha, qaybta kaalmada adeegyada, waxalf guaman . So [facility] [phone].    ATENCIÓN: Si habla español, tiene a velazquez disposición servicios gratuitos de asistencia lingüística. Llame al [phone].    We comply with applicable federal civil rights laws and [place] laws. We do not discriminate on the basis of race, color, national origin, age, disability sex, sexual orientation or gender identity.            Thank you!     Thank you for choosing [facility] FOR [facility]  for your care. Our goal is always to provide you with excellent care. Hearing back from our patients is one way we can continue to improve our services. Please take a few minutes to complete the written survey that you may receive in the mail after your visit with us. Thank you!             Your Updated Medication List - Protect others around you: Learn how to safely use, store and throw away your medicines at www.disposemymeds.org.          This list is accurate as of: " 7/24/17 11:59 AM.  Always use your most recent med list.                   Brand Name Dispense Instructions for use Diagnosis    furosemide 20 MG tablet    LASIX     Take 20 mg by mouth        loratadine 10 MG tablet    CLARITIN     Take 10 mg by mouth        metoprolol 50 MG 24 hr tablet    TOPROL-XL     Take 50 mg by mouth        ZANTAC PO

## 2017-07-25 ENCOUNTER — ANESTHESIA EVENT (OUTPATIENT)
Dept: SURGERY | Facility: CLINIC | Age: 57
End: 2017-07-25
Payer: COMMERCIAL

## 2017-07-25 ENCOUNTER — ANESTHESIA (OUTPATIENT)
Dept: SURGERY | Facility: CLINIC | Age: 57
End: 2017-07-25
Payer: COMMERCIAL

## 2017-07-25 ENCOUNTER — SURGERY (OUTPATIENT)
Age: 57
End: 2017-07-25

## 2017-07-25 ENCOUNTER — HOSPITAL ENCOUNTER (OUTPATIENT)
Facility: CLINIC | Age: 57
Discharge: HOME | End: 2017-07-25
Attending: OBSTETRICS & GYNECOLOGY | Admitting: OBSTETRICS & GYNECOLOGY
Payer: COMMERCIAL

## 2017-07-25 VITALS
WEIGHT: 229.2 LBS | BODY MASS INDEX: 40.61 KG/M2 | RESPIRATION RATE: 16 BRPM | HEIGHT: 63 IN | DIASTOLIC BLOOD PRESSURE: 88 MMHG | SYSTOLIC BLOOD PRESSURE: 136 MMHG | TEMPERATURE: 96.3 F | OXYGEN SATURATION: 97 %

## 2017-07-25 DIAGNOSIS — N95.0 POSTMENOPAUSAL BLEEDING: Primary | ICD-10-CM

## 2017-07-25 LAB — POTASSIUM SERPL-SCNC: 3.9 MMOL/L (ref 3.4–5.3)

## 2017-07-25 PROCEDURE — 71000012 ZZH RECOVERY PHASE 1 LEVEL 1 FIRST HR: Performed by: OBSTETRICS & GYNECOLOGY

## 2017-07-25 PROCEDURE — 58563 HYSTEROSCOPY ABLATION: CPT | Performed by: OBSTETRICS & GYNECOLOGY

## 2017-07-25 PROCEDURE — 36000056 ZZH SURGERY LEVEL 3 1ST 30 MIN: Performed by: OBSTETRICS & GYNECOLOGY

## 2017-07-25 PROCEDURE — 71000013 ZZH RECOVERY PHASE 1 LEVEL 1 EA ADDTL HR: Performed by: OBSTETRICS & GYNECOLOGY

## 2017-07-25 PROCEDURE — 25000128 H RX IP 250 OP 636: Performed by: NURSE ANESTHETIST, CERTIFIED REGISTERED

## 2017-07-25 PROCEDURE — 25000125 ZZHC RX 250: Performed by: NURSE ANESTHETIST, CERTIFIED REGISTERED

## 2017-07-25 PROCEDURE — 40000170 ZZH STATISTIC PRE-PROCEDURE ASSESSMENT II: Performed by: OBSTETRICS & GYNECOLOGY

## 2017-07-25 PROCEDURE — 88305 TISSUE EXAM BY PATHOLOGIST: CPT | Mod: 26 | Performed by: OBSTETRICS & GYNECOLOGY

## 2017-07-25 PROCEDURE — 37000008 ZZH ANESTHESIA TECHNICAL FEE, 1ST 30 MIN: Performed by: OBSTETRICS & GYNECOLOGY

## 2017-07-25 PROCEDURE — 25000132 ZZH RX MED GY IP 250 OP 250 PS 637: Performed by: OBSTETRICS & GYNECOLOGY

## 2017-07-25 PROCEDURE — 36415 COLL VENOUS BLD VENIPUNCTURE: CPT | Performed by: ANESTHESIOLOGY

## 2017-07-25 PROCEDURE — 71000027 ZZH RECOVERY PHASE 2 EACH 15 MINS: Performed by: OBSTETRICS & GYNECOLOGY

## 2017-07-25 PROCEDURE — 25800025 ZZH RX 258: Performed by: OBSTETRICS & GYNECOLOGY

## 2017-07-25 PROCEDURE — 27210794 ZZH OR GENERAL SUPPLY STERILE: Performed by: OBSTETRICS & GYNECOLOGY

## 2017-07-25 PROCEDURE — 88305 TISSUE EXAM BY PATHOLOGIST: CPT | Performed by: OBSTETRICS & GYNECOLOGY

## 2017-07-25 PROCEDURE — 84132 ASSAY OF SERUM POTASSIUM: CPT | Performed by: ANESTHESIOLOGY

## 2017-07-25 PROCEDURE — 36000058 ZZH SURGERY LEVEL 3 EA 15 ADDTL MIN: Performed by: OBSTETRICS & GYNECOLOGY

## 2017-07-25 PROCEDURE — 25000128 H RX IP 250 OP 636: Performed by: ANESTHESIOLOGY

## 2017-07-25 PROCEDURE — 37000009 ZZH ANESTHESIA TECHNICAL FEE, EACH ADDTL 15 MIN: Performed by: OBSTETRICS & GYNECOLOGY

## 2017-07-25 RX ORDER — ONDANSETRON 2 MG/ML
4 INJECTION INTRAMUSCULAR; INTRAVENOUS EVERY 30 MIN PRN
Status: DISCONTINUED | OUTPATIENT
Start: 2017-07-25 | End: 2017-07-25 | Stop reason: HOSPADM

## 2017-07-25 RX ORDER — ONDANSETRON 4 MG/1
4 TABLET, ORALLY DISINTEGRATING ORAL EVERY 30 MIN PRN
Status: DISCONTINUED | OUTPATIENT
Start: 2017-07-25 | End: 2017-07-25 | Stop reason: HOSPADM

## 2017-07-25 RX ORDER — KETOROLAC TROMETHAMINE 30 MG/ML
30 INJECTION, SOLUTION INTRAMUSCULAR; INTRAVENOUS EVERY 6 HOURS PRN
Status: DISCONTINUED | OUTPATIENT
Start: 2017-07-25 | End: 2017-07-25 | Stop reason: HOSPADM

## 2017-07-25 RX ORDER — DEXAMETHASONE SODIUM PHOSPHATE 4 MG/ML
INJECTION, SOLUTION INTRA-ARTICULAR; INTRALESIONAL; INTRAMUSCULAR; INTRAVENOUS; SOFT TISSUE PRN
Status: DISCONTINUED | OUTPATIENT
Start: 2017-07-25 | End: 2017-07-25

## 2017-07-25 RX ORDER — KETOROLAC TROMETHAMINE 30 MG/ML
INJECTION, SOLUTION INTRAMUSCULAR; INTRAVENOUS PRN
Status: DISCONTINUED | OUTPATIENT
Start: 2017-07-25 | End: 2017-07-25

## 2017-07-25 RX ORDER — PROPOFOL 10 MG/ML
INJECTION, EMULSION INTRAVENOUS CONTINUOUS PRN
Status: DISCONTINUED | OUTPATIENT
Start: 2017-07-25 | End: 2017-07-25

## 2017-07-25 RX ORDER — NALOXONE HYDROCHLORIDE 0.4 MG/ML
.1-.4 INJECTION, SOLUTION INTRAMUSCULAR; INTRAVENOUS; SUBCUTANEOUS
Status: DISCONTINUED | OUTPATIENT
Start: 2017-07-25 | End: 2017-07-25 | Stop reason: HOSPADM

## 2017-07-25 RX ORDER — ONDANSETRON 2 MG/ML
INJECTION INTRAMUSCULAR; INTRAVENOUS PRN
Status: DISCONTINUED | OUTPATIENT
Start: 2017-07-25 | End: 2017-07-25

## 2017-07-25 RX ORDER — HYDROMORPHONE HYDROCHLORIDE 1 MG/ML
.3-.5 INJECTION, SOLUTION INTRAMUSCULAR; INTRAVENOUS; SUBCUTANEOUS EVERY 10 MIN PRN
Status: DISCONTINUED | OUTPATIENT
Start: 2017-07-25 | End: 2017-07-25 | Stop reason: HOSPADM

## 2017-07-25 RX ORDER — FENTANYL CITRATE 50 UG/ML
25-50 INJECTION, SOLUTION INTRAMUSCULAR; INTRAVENOUS EVERY 5 MIN PRN
Status: DISCONTINUED | OUTPATIENT
Start: 2017-07-25 | End: 2017-07-25 | Stop reason: HOSPADM

## 2017-07-25 RX ORDER — HYDROCODONE BITARTRATE AND ACETAMINOPHEN 5; 325 MG/1; MG/1
1-2 TABLET ORAL EVERY 4 HOURS PRN
Qty: 15 TABLET | Refills: 0 | Status: SHIPPED | OUTPATIENT
Start: 2017-07-25 | End: 2017-08-07

## 2017-07-25 RX ORDER — FENTANYL CITRATE 50 UG/ML
INJECTION, SOLUTION INTRAMUSCULAR; INTRAVENOUS PRN
Status: DISCONTINUED | OUTPATIENT
Start: 2017-07-25 | End: 2017-07-25

## 2017-07-25 RX ORDER — MEPERIDINE HYDROCHLORIDE 25 MG/ML
12.5 INJECTION INTRAMUSCULAR; INTRAVENOUS; SUBCUTANEOUS
Status: DISCONTINUED | OUTPATIENT
Start: 2017-07-25 | End: 2017-07-25 | Stop reason: HOSPADM

## 2017-07-25 RX ORDER — HYDROCODONE BITARTRATE AND ACETAMINOPHEN 5; 325 MG/1; MG/1
1-2 TABLET ORAL
Status: COMPLETED | OUTPATIENT
Start: 2017-07-25 | End: 2017-07-25

## 2017-07-25 RX ORDER — LIDOCAINE HYDROCHLORIDE 20 MG/ML
INJECTION, SOLUTION INFILTRATION; PERINEURAL PRN
Status: DISCONTINUED | OUTPATIENT
Start: 2017-07-25 | End: 2017-07-25

## 2017-07-25 RX ORDER — SODIUM CHLORIDE, SODIUM LACTATE, POTASSIUM CHLORIDE, CALCIUM CHLORIDE 600; 310; 30; 20 MG/100ML; MG/100ML; MG/100ML; MG/100ML
INJECTION, SOLUTION INTRAVENOUS CONTINUOUS PRN
Status: DISCONTINUED | OUTPATIENT
Start: 2017-07-25 | End: 2017-07-25

## 2017-07-25 RX ORDER — SODIUM CHLORIDE, SODIUM LACTATE, POTASSIUM CHLORIDE, CALCIUM CHLORIDE 600; 310; 30; 20 MG/100ML; MG/100ML; MG/100ML; MG/100ML
INJECTION, SOLUTION INTRAVENOUS CONTINUOUS
Status: DISCONTINUED | OUTPATIENT
Start: 2017-07-25 | End: 2017-07-25 | Stop reason: HOSPADM

## 2017-07-25 RX ORDER — PROPOFOL 10 MG/ML
INJECTION, EMULSION INTRAVENOUS PRN
Status: DISCONTINUED | OUTPATIENT
Start: 2017-07-25 | End: 2017-07-25

## 2017-07-25 RX ORDER — FENTANYL CITRATE 50 UG/ML
25-50 INJECTION, SOLUTION INTRAMUSCULAR; INTRAVENOUS
Status: DISCONTINUED | OUTPATIENT
Start: 2017-07-25 | End: 2017-07-25 | Stop reason: HOSPADM

## 2017-07-25 RX ADMIN — PROPOFOL 200 MCG/KG/MIN: 10 INJECTION, EMULSION INTRAVENOUS at 09:53

## 2017-07-25 RX ADMIN — ONDANSETRON 4 MG: 2 INJECTION INTRAMUSCULAR; INTRAVENOUS at 10:00

## 2017-07-25 RX ADMIN — SODIUM CHLORIDE 1000 ML: 900 IRRIGANT IRRIGATION at 10:15

## 2017-07-25 RX ADMIN — KETOROLAC TROMETHAMINE 30 MG: 30 INJECTION, SOLUTION INTRAMUSCULAR at 10:14

## 2017-07-25 RX ADMIN — FENTANYL CITRATE 50 MCG: 50 INJECTION, SOLUTION INTRAMUSCULAR; INTRAVENOUS at 09:51

## 2017-07-25 RX ADMIN — LIDOCAINE HYDROCHLORIDE 60 MG: 20 INJECTION, SOLUTION INFILTRATION; PERINEURAL at 09:53

## 2017-07-25 RX ADMIN — HYDROCODONE BITARTRATE AND ACETAMINOPHEN 1 TABLET: 5; 325 TABLET ORAL at 11:27

## 2017-07-25 RX ADMIN — SODIUM CHLORIDE, POTASSIUM CHLORIDE, SODIUM LACTATE AND CALCIUM CHLORIDE: 600; 310; 30; 20 INJECTION, SOLUTION INTRAVENOUS at 09:51

## 2017-07-25 RX ADMIN — PROPOFOL 200 MG: 10 INJECTION, EMULSION INTRAVENOUS at 09:53

## 2017-07-25 RX ADMIN — DEXAMETHASONE SODIUM PHOSPHATE 4 MG: 4 INJECTION, SOLUTION INTRA-ARTICULAR; INTRALESIONAL; INTRAMUSCULAR; INTRAVENOUS; SOFT TISSUE at 09:56

## 2017-07-25 RX ADMIN — MIDAZOLAM HYDROCHLORIDE 2 MG: 1 INJECTION, SOLUTION INTRAMUSCULAR; INTRAVENOUS at 09:51

## 2017-07-25 RX ADMIN — SODIUM CHLORIDE, POTASSIUM CHLORIDE, SODIUM LACTATE AND CALCIUM CHLORIDE: 600; 310; 30; 20 INJECTION, SOLUTION INTRAVENOUS at 11:06

## 2017-07-25 RX ADMIN — FENTANYL CITRATE 50 MCG: 50 INJECTION, SOLUTION INTRAMUSCULAR; INTRAVENOUS at 10:57

## 2017-07-25 NOTE — BRIEF OP NOTE
BRIEF OP NOTE;  Cervical dilatation for stenosis, hysteroscopy, polypectomy, endometrial curettage, endometrial ablation   No comp   ebl 2 cc

## 2017-07-25 NOTE — DISCHARGE INSTRUCTIONS
Same Day Surgery Discharge Instructions for  Sedation and General Anesthesia       It's not unusual to feel dizzy, light-headed or faint for up to 24 hours after surgery or while taking pain medication.  If you have these symptoms: sit for a few minutes before standing and have someone assist you when you get up to walk or use the bathroom.      You should rest and relax for the next 24 hours. We recommend you make arrangements to have an adult stay with you for at least 24 hours after your discharge.  Avoid hazardous and strenuous activity.      DO NOT DRIVE any vehicle or operate mechanical equipment for 24 hours following the end of your surgery.  Even though you may feel normal, your reactions may be affected by the medication you have received.      Do not drink alcoholic beverages for 24 hours following surgery.       Slowly progress to your regular diet as you feel able. It's not unusual to feel nauseated and/or vomit after receiving anesthesia.  If you develop these symptoms, drink clear liquids (apple juice, ginger ale, broth, 7-up, etc. ) until you feel better.  If your nausea and vomiting persists for 24 hours, please notify your surgeon.        All narcotic pain medications, along with inactivity and anesthesia, can cause constipation. Drinking plenty of liquids and increasing fiber intake will help.      For any questions of a medical nature, call your surgeon.      Do not make important decisions for 24 hours.      If you had general anesthesia, you may have a sore throat for a couple of days related to the breathing tube used during surgery.  You may use Cepacol lozenges to help with this discomfort.  If it worsens or if you develop a fever, contact your surgeon.       If you feel your pain is not well managed with the pain medications prescribed by your surgeon, please contact your surgeon's office to let them know so they can address your concerns.       While you were at the hospital today you  received Toradol, an antiinflammatory medication similar to Ibuprofen.  You should not take other antiinflammatory medication, such as Ibuprofen, Motrin, Advil, Aleve, Naprosyn, etc, until 4:15 PM       [facility]  Discharge Instructions  Following D & C / Hysteroscopy    Activity  You may resume normal activities including lifting as needed.  It is permissible to climb stairs. You may drive after 24 hours as long as you are not taking narcotic pain pills.  Baths or showers are perfectly acceptable.      Vaginal Discharge  You may have some vaginal bleeding or discharge for about a week after procedure.  You may use tampons or pads.    Temperature  If you develop temperature elevations to over 101  Fahrenheit, your physician should be called immediately.    Diet  [place] or light diet is advisable the day of surgery.  If nausea persists, continue this diet.  If severe, call.    Follow-up  Make an appointment in 1-2 weeks if instructed to at: [phone]        [facility]  [address] [place] [address]  [phone]  GIBSON Kohli MD   .        **Because you had anesthesia today and your history of sleep apnea, it is extremely important that you use your CPAP machine for the next 24 hours while napping or sleeping.**

## 2017-07-25 NOTE — ANESTHESIA PREPROCEDURE EVALUATION
Anesthesia Evaluation     . Pt has had prior anesthetic. Type: General    No history of anesthetic complications          ROS/MED HX    ENT/Pulmonary:  - neg pulmonary ROS     Neurologic:       Cardiovascular:     (+) hypertension----. : . . . :. .       METS/Exercise Tolerance:     Hematologic:         Musculoskeletal:         GI/Hepatic:     (+) GERD Asymptomatic on medication,       Renal/Genitourinary:         Endo:     (+) Obesity, .      Psychiatric:         Infectious Disease:         Malignancy:         Other:                     Physical Exam  Normal systems: cardiovascular, pulmonary and dental    Airway   Mallampati: II  TM distance: >3 FB  Neck ROM: full    Dental     Cardiovascular   Rhythm and rate: regular and normal      Pulmonary    breath sounds clear to auscultation                    Anesthesia Plan      History & Physical Review  History and physical reviewed and following examination; no interval change.    ASA Status:  3 .    NPO Status:  > 8 hours    Plan for General and LMA with Propofol and Intravenous induction. Maintenance will be TIVA.    PONV prophylaxis:  Ondansetron (or other 5HT-3) and Dexamethasone or Solumedrol  Tordal if OK with surgeon      Postoperative Care  Postoperative pain management:  IV analgesics and Oral pain medications.      Consents  Anesthetic plan, risks, benefits and alternatives discussed with:  Patient..          DPreop diagnosis: POST MENOPAUSAL BLEEDING  Procedure(s):  COMBINED DILATION AND CURETTAGE, HYSTEROSCOPY DIAGNOSTIC  Allergies   Allergen Reactions     Sulfa Drugs Hives       No current facility-administered medications on file prior to encounter.   Current Outpatient Prescriptions on File Prior to Encounter:  metoprolol (TOPROL-XL) 50 MG 24 hr tablet Take 50 mg by mouth   furosemide (LASIX) 20 MG tablet Take 20 mg by mouth   loratadine (CLARITIN) 10 MG tablet Take 10 mg by mouth   Ranitidine HCl (ZANTAC PO) Take 1 tablet by mouth At Bedtime       Hemoglobin   Date Value Ref Range Status   07/24/2017 12.4 11.7 - 15.7 g/dL Final                     .

## 2017-07-25 NOTE — IP AVS SNAPSHOT
MRN:[id number]                      After Visit Summary   7/25/2017    Wendy Goltzman    MRN: [id number]           Thank you!     Thank you for choosing [facility] for your care. Our goal is always to provide you with excellent care. Hearing back from our patients is one way we can continue to improve our services. Please take a few minutes to complete the written survey that you may receive in the mail after you visit with us. Thank you!        Patient Information     Date Of Birth          1960        About your hospital stay     You were admitted on:  July 25, 2017 You last received care in the:  [facility]    You were discharged on:  July 25, 2017       Who to Call     For medical emergencies, please call 911.  For non-urgent questions about your medical care, please call your primary care provider or clinic, [phone]  For questions related to your surgery, please call your surgery clinic        Attending Provider     Provider Perry Perez MD OB/Gyn       Primary Care Provider Office Phone # Fax #    Delfina Garcia [phone]      After Care Instructions     Discharge Instructions       Patient to arrange follow up appointment in 1-2  weeks                  Further instructions from your care team       Same Day Surgery Discharge Instructions for  Sedation and General Anesthesia       It's not unusual to feel dizzy, light-headed or faint for up to 24 hours after surgery or while taking pain medication.  If you have these symptoms: sit for a few minutes before standing and have someone assist you when you get up to walk or use the bathroom.      You should rest and relax for the next 24 hours. We recommend you make arrangements to have an adult stay with you for at least 24 hours after your discharge.  Avoid hazardous and strenuous activity.      DO NOT DRIVE any vehicle or operate mechanical equipment for 24 hours following  the end of your surgery.  Even though you may feel normal, your reactions may be affected by the medication you have received.      Do not drink alcoholic beverages for 24 hours following surgery.       Slowly progress to your regular diet as you feel able. It's not unusual to feel nauseated and/or vomit after receiving anesthesia.  If you develop these symptoms, drink clear liquids (apple juice, ginger ale, broth, 7-up, etc. ) until you feel better.  If your nausea and vomiting persists for 24 hours, please notify your surgeon.        All narcotic pain medications, along with inactivity and anesthesia, can cause constipation. Drinking plenty of liquids and increasing fiber intake will help.      For any questions of a medical nature, call your surgeon.      Do not make important decisions for 24 hours.      If you had general anesthesia, you may have a sore throat for a couple of days related to the breathing tube used during surgery.  You may use Cepacol lozenges to help with this discomfort.  If it worsens or if you develop a fever, contact your surgeon.       If you feel your pain is not well managed with the pain medications prescribed by your surgeon, please contact your surgeon's office to let them know so they can address your concerns.       While you were at the hospital today you received Toradol, an antiinflammatory medication similar to Ibuprofen.  You should not take other antiinflammatory medication, such as Ibuprofen, Motrin, Advil, Aleve, Naprosyn, etc, until 4:15 PM       [facility]  Discharge Instructions  Following D & C / Hysteroscopy    Activity  You may resume normal activities including lifting as needed.  It is permissible to climb stairs. You may drive after 24 hours as long as you are not taking narcotic pain pills.  Baths or showers are perfectly acceptable.      Vaginal Discharge  You may have some vaginal bleeding or discharge for about a week after procedure.  You may  "use tampons or pads.    Temperature  If you develop temperature elevations to over 101  Fahrenheit, your physician should be called immediately.    Diet  [place] or light diet is advisable the day of surgery.  If nausea persists, continue this diet.  If severe, call.    Follow-up  Make an appointment in 1-2 weeks if instructed to at: [phone]        [facility]  [address] [place] [address] [address] [place] [address]  [phone]  GIBSON Kohli MD   .        **Because you had anesthesia today and your history of sleep apnea, it is extremely important that you use your CPAP machine for the next 24 hours while napping or sleeping.**    Pending Results     Date and Time Order Name Status Description    2017 1010 Surgical pathology exam In process             Admission Information     Date & Time Provider Department Dept. Phone    2017 Perry Urbian MD [facility] Same Day [facility] [phone]      Your Vitals Were     Blood Pressure Temperature Respirations Height Weight Last Period    120/82 96.3  F (35.7  C) 16 1.594 m (5' 2.75\") 104 kg (229 lb 3.2 oz) (LMP Unknown)    Pulse Oximetry BMI (Body Mass Index)                96% 40.93 kg/m2          [employer] Information     [employer] lets you send messages to your doctor, view your test results, renew your prescriptions, schedule appointments and more. To sign up, go to [email or web address] . Click on \"Log in\" on the left side of the screen, which will take you to the Welcome page. Then click on \"Sign up Now\" on the right side of the page.     You will be asked to enter the access code listed below, as well as some personal information. Please follow the directions to create your username and password.     Your access code is: [email or web address]  Expires: 10/22/2017 11:59 AM     Your access code will  in 90 days. If you need help or a new code, please call your [facility] clinic or [phone].      "   Care EveryWhere ID     This is your Care EveryWhere ID. This could be used by other organizations to access your [facility] medical records  [id number]        Equal Access to Services     NALLELY MONTOYA : Jenae Chopra, tasneem rutherford, skylermu birdpaula guerrero, faustino efein hayaaronda lebronminor carrera deniz hampton. So [facility] [phone].    ATENCIÓN: Si habla español, tiene a velazquez disposición servicios gratuitos de asistencia lingüística. Llame al [phone].    We comply with applicable federal civil rights laws and [place] laws. We do not discriminate on the basis of race, color, national origin, age, disability sex, sexual orientation or gender identity.               Review of your medicines      START taking        Dose / Directions    HYDROcodone-acetaminophen 5-325 MG per tablet   Commonly known as:  NORCO   Used for:  Postmenopausal bleeding   Notes to Patient:  One pain pill taken at 11:30 am.        Dose:  1-2 tablet   Take 1-2 tablets by mouth every 4 hours as needed for other (Moderate to Severe Pain)   Quantity:  15 tablet   Refills:  0         CONTINUE these medicines which have NOT CHANGED        Dose / Directions    furosemide 20 MG tablet   Commonly known as:  LASIX        Dose:  20 mg   Take 20 mg by mouth   Refills:  0       loratadine 10 MG tablet   Commonly known as:  CLARITIN        Dose:  10 mg   Take 10 mg by mouth   Refills:  0       metoprolol 50 MG 24 hr tablet   Commonly known as:  TOPROL-XL        Dose:  50 mg   Take 50 mg by mouth   Refills:  0       ZANTAC PO        Dose:  1 tablet   Take 1 tablet by mouth At Bedtime   Refills:  0            Where to get your medicines      Some of these will need a paper prescription and others can be bought over the counter. Ask your nurse if you have questions.     Bring a paper prescription for each of these medications     HYDROcodone-acetaminophen 5-325 MG per tablet                Protect others around you: Learn how to safely use, store  and throw away your medicines at www.disposemymeds.org.             Medication List: This is a list of all your medications and when to take them. Check marks below indicate your daily home schedule. Keep this list as a reference.      Medications           Morning Afternoon Evening Bedtime As Needed    furosemide 20 MG tablet   Commonly known as:  LASIX   Take 20 mg by mouth                                HYDROcodone-acetaminophen 5-325 MG per tablet   Commonly known as:  NORCO   Take 1-2 tablets by mouth every 4 hours as needed for other (Moderate to Severe Pain)   Last time this was given:  1 tablet on 7/25/2017 11:27 AM   Notes to Patient:  One pain pill taken at 11:30 am.                                loratadine 10 MG tablet   Commonly known as:  CLARITIN   Take 10 mg by mouth                                metoprolol 50 MG 24 hr tablet   Commonly known as:  TOPROL-XL   Take 50 mg by mouth                                ZANTAC PO   Take 1 tablet by mouth At Bedtime

## 2017-07-25 NOTE — OP NOTE
Surgeon / Clinician: Perry Urbina Jr, MD    Date of Surgery:  07/25/2017    REASON FOR ADMISSION:  Postmenopausal bleeding.  Probable endometrial polyp.    Operative Procedure:  Cervical dilatation for cervical stenosis, hysteroscopy, polypectomy, endometrial curettage, endometrial ablation.    Operative FINDINGS:  Ms. Goltzman had a very scarred and misshapen cervix from childbirth.  There was a cervical stenosis that needed to be overcome to perform the hysteroscopy.  There were 2 posterior wall polyps that were removed and sent separately.  The remainder of the cavity was curetted but there was no suspicious signs.  Both tubal ostia were visualized.  We ablated the entire cavity down to the lower uterine segment post curettage.    OPERATIVE PROCEDURE:  After general anesthesia was induced the patient was placed in the dorsal lithotomy position and prepped and draped in the usual fashion.  Examination under anesthesia showed a very misshapen cervix but midline uterus.  The posterior cervical lip was grasped as there was no anterior cervix.  The cervical stenosis was overcome with a lacrimal duct probe and then finest dilator.  This was carried up to a 20 Cedeno dilator.  The hysteroscope was placed.  A grasping forceps was used to hysteroscopically remove 2 polyps.  An endometrial curettage was performed and tissue submitted to the pathologist.  The cavity was irrigated.  We then used the VersaPoint under coagulation and then cutting current to perform an endometrial ablation of the entire cavity to the lower uterine segment.  The patient tolerated this very well.  She went to the recovery room in satisfactory condition and will be discharged to home.    POSTOPERATIVE DIAGNOSIS:  Cervical stenosis, endometrial polyp x2.        Perry Urbina Jr, MD    D:  07/25/2017 11:29 T:  07/25/2017 11:50  Document:  [id number] [facility]

## 2017-07-25 NOTE — ANESTHESIA POSTPROCEDURE EVALUATION
Patient: Wendy Goltzman    Procedure(s):  DILATION AND CURETTAGE, HYSTEROSCOPY, POLYPECTOMY, ENDOMETRIAL ABLATION WITH VERSAPOINT - Wound Class: II-Clean Contaminated    Diagnosis:POST MENOPAUSAL BLEEDING  Diagnosis Additional Information: No value filed.    Anesthesia Type:  General, LMA    Note:  Anesthesia Post Evaluation    Patient location during evaluation: PACU  Patient participation: Able to fully participate in evaluation  Level of consciousness: awake  Pain management: adequate  Airway patency: patent  Cardiovascular status: acceptable  Respiratory status: acceptable  Hydration status: acceptable  PONV: none     Anesthetic complications: None          Last vitals:  Vitals:    07/25/17 1030 07/25/17 1100 07/25/17 1130   BP: 128/88 120/82    Resp: 16 13 16   Temp:  35.7  C (96.3  F)    SpO2:  96%          Electronically Signed By: Thom Conley MD  July 25, 2017  11:57 AM

## 2017-07-25 NOTE — ADDENDUM NOTE
Addendum  created 07/25/17 1200 by Talia Menard APRN CRNA    Anesthesia Intra LDAs edited, LDA properties accepted

## 2017-07-25 NOTE — IP AVS SNAPSHOT
[facility]    [address]    [place] [address]    Phone:  [phone]    Fax:  [phone]                                       After Visit Summary   7/25/2017    Wendy Goltzman    MRN: [id number]           After Visit Summary Signature Page     I have received my discharge instructions, and my questions have been answered. I have discussed any challenges I see with this plan with the nurse or doctor.    ..........................................................................................................................................  Patient/Patient Representative Signature      ..........................................................................................................................................  Patient Representative Print Name and Relationship to Patient    ..................................................               ................................................  Date                                            Time    ..........................................................................................................................................  Reviewed by Signature/Title    ...................................................              ..............................................  Date                                                            Time

## 2017-07-25 NOTE — ANESTHESIA CARE TRANSFER NOTE
Patient: Wendy Goltzman    Procedure(s):  DILATION AND CURETTAGE, HYSTEROSCOPY, POLYPECTOMY, ENDOMETRIAL ABLATION WITH VERSAPOINT - Wound Class: II-Clean Contaminated    Diagnosis: POST MENOPAUSAL BLEEDING  Diagnosis Additional Information: No value filed.    Anesthesia Type:   General, LMA     Note:  Airway :LMA and Face Mask  Patient transferred to:PACU  Comments: Pt to PACU with LMA in place and O2 via mask, airway patent.  Upon arrival to PACU, LMA was pulled with pt arousal, O2 via mask.  VSS throughout.  Report to RN.      Vitals: (Last set prior to Anesthesia Care Transfer)    CRNA VITALS  7/25/2017 0954 - 7/25/2017 1030      7/25/2017             NIBP: (!)  151/101    NIBP Mean: 115                Electronically Signed By: KARIME Amezcua CRNA  July 25, 2017  10:30 AM

## 2017-07-26 LAB — COPATH REPORT: NORMAL

## 2017-08-07 ENCOUNTER — OFFICE VISIT (OUTPATIENT)
Dept: OBGYN | Facility: CLINIC | Age: 57
End: 2017-08-07
Payer: COMMERCIAL

## 2017-08-07 VITALS
HEIGHT: 63 IN | DIASTOLIC BLOOD PRESSURE: 68 MMHG | SYSTOLIC BLOOD PRESSURE: 112 MMHG | BODY MASS INDEX: 40.22 KG/M2 | WEIGHT: 227 LBS

## 2017-08-07 DIAGNOSIS — Z48.816 AFTERCARE FOLLOWING SURGERY OF THE GENITOURINARY SYSTEM: Primary | ICD-10-CM

## 2017-08-07 DIAGNOSIS — Z98.890 POSTOPERATIVE STATE: Primary | ICD-10-CM

## 2017-08-07 LAB — HGB BLD-MCNC: 13 G/DL (ref 11.7–15.7)

## 2017-08-07 PROCEDURE — 85018 HEMOGLOBIN: CPT | Performed by: OBSTETRICS & GYNECOLOGY

## 2017-08-07 PROCEDURE — 36415 COLL VENOUS BLD VENIPUNCTURE: CPT | Performed by: OBSTETRICS & GYNECOLOGY

## 2017-08-07 PROCEDURE — 99212 OFFICE O/P EST SF 10 MIN: CPT | Performed by: OBSTETRICS & GYNECOLOGY

## 2017-08-07 NOTE — MR AVS SNAPSHOT
"              After Visit Summary   8/7/2017    Wendy Goltzman    MRN: [id number]           Patient Information     Date Of Birth          1960        Visit Information        Provider Department      8/7/2017 4:00 PM Perry Urbina MD [facility]        Today's Diagnoses     Postoperative state    -  1       Follow-ups after your visit        Your next 10 appointments already scheduled     Nov 07, 2017  3:45 PM CST   SHORT with Perry Urbina MD   [facility]    [address] [place] [id number] [phone]              Who to contact     If you have questions or need follow up information about today's clinic visit or your schedule please contact [facility] directly at [phone].  Normal or non-critical lab and imaging results will be communicated to you by [employer], letter or phone within 4 business days after the clinic has received the results. If you do not hear from us within 7 days, please contact the clinic through [employer] or phone. If you have a critical or abnormal lab result, we will notify you by phone as soon as possible.  Submit refill requests through [employer] or call your pharmacy and they will forward the refill request to us. Please allow 3 business days for your refill to be completed.          Additional Information About Your Visit        MyChart Information     [employer] lets you send messages to your doctor, view your test results, renew your prescriptions, schedule appointments and more. To sign up, go to [email or web address] . Click on \"Log in\" on the left side of the screen, which will take you to the Welcome page. Then click on \"Sign up Now\" on the right side of the page.     You will be asked to enter the access code listed below, as well as some personal information. Please follow the directions to create your username and password.     Your access code " "is: [email or web address]  Expires: 10/22/2017 11:59 AM     Your access code will  in 90 days. If you need help or a new code, please call your [facility] clinic or [phone].        Care EveryWhere ID     This is your Care EveryWhere ID. This could be used by other organizations to access your [facility] medical records  [id number]        Your Vitals Were     Height Last Period BMI (Body Mass Index)             5' 2.75\" (1.594 m) (LMP Unknown) 40.53 kg/m2          Blood Pressure from Last 3 Encounters:   17 112/68   17 136/88   17 122/68    Weight from Last 3 Encounters:   17 227 lb (103 kg)   17 229 lb 3.2 oz (104 kg)   17 229 lb (103.9 kg)              Today, you had the following     No orders found for display       Primary Care Provider Office Phone # Fax #    Delfina Garcia [phone]       [facility] [address]  [facility] [address]        Equal Access to Services     : Hadii aad ku hadasho Soomaali, waaxda luqadaha, qaybta kaalmada adeegyada, faustino dexter haymalikn rachele guaman . So [facility] [phone].    ATENCIÓN: Si habla español, tiene a velazquez disposición servicios gratuitos de asistencia lingüística. Llame al [phone].    We comply with applicable federal civil rights laws and [place] laws. We do not discriminate on the basis of race, color, national origin, age, disability sex, sexual orientation or gender identity.            Thank you!     Thank you for choosing [facility]  for your care. Our goal is always to provide you with excellent care. Hearing back from our patients is one way we can continue to improve our services. Please take a few minutes to complete the written survey that you may receive in the mail after your visit with us. Thank you!             Your Updated Medication List - Protect others around you: Learn how to safely use, store and throw away your medicines at " www.disposemymeds.org.          This list is accurate as of: 8/7/17  4:16 PM.  Always use your most recent med list.                   Brand Name Dispense Instructions for use Diagnosis    furosemide 20 MG tablet    LASIX     Take 20 mg by mouth        loratadine 10 MG tablet    CLARITIN     Take 10 mg by mouth        metoprolol 50 MG 24 hr tablet    TOPROL-XL     Take 50 mg by mouth        ZANTAC PO      Take 1 tablet by mouth At Bedtime

## 2017-08-07 NOTE — PROGRESS NOTES
The patient is seen for her postoperative check after hysteroscopy polypectomy and ablation. Her pathology was benign. Her hemoglobin is 13 g percent. Her examination is unremarkable. We have asked her to return to see us in 3 months or sooner if she does any bleeding. Definitive surgery was discussed.

## 2017-11-07 ENCOUNTER — OFFICE VISIT (OUTPATIENT)
Dept: OBGYN | Facility: CLINIC | Age: 57
End: 2017-11-07
Payer: COMMERCIAL

## 2017-11-07 VITALS — BODY MASS INDEX: 41.07 KG/M2 | WEIGHT: 230 LBS | SYSTOLIC BLOOD PRESSURE: 120 MMHG | DIASTOLIC BLOOD PRESSURE: 84 MMHG

## 2017-11-07 DIAGNOSIS — N84.0 ENDOMETRIAL POLYP: Primary | ICD-10-CM

## 2017-11-07 PROCEDURE — 99212 OFFICE O/P EST SF 10 MIN: CPT | Performed by: OBSTETRICS & GYNECOLOGY

## 2017-11-07 NOTE — PROGRESS NOTES
SUBJECTIVE:                                                   Wendy Goltzman is a 57 year old female who presents to clinic today for the following health issue(s):  Patient presents with:  Surgical Followup: 17 DILATION AND CURETTAGE, HYSTEROSCOPY, POLYPECTOMY, ENDOMETRIAL ABLATION WITH VERSAPOINT      HPI: The patient is seen in follow-up of abnormal bleeding with hysteroscopy polypectomy and endometrial ablation performed in July. She has had actually no bleeding cramping abnormal discharge or spotting. She is very pleased with the outcome of the procedure.      No LMP recorded (lmp unknown). Patient is postmenopausal..   Patient is sexually active, .  Using none for contraception.    reports that she has never smoked. She has never used smokeless tobacco.      STD testing offered?  Declined    Health maintenance updated:  yes    Today's PHQ-2 Score: PHQ-2 (  [employer]) 2016   Q1: Little interest or pleasure in doing things 0   Q2: Feeling down, depressed or hopeless 0   PHQ-2 Score 0     Today's PHQ-9 Score:   PHQ-9 SCORE 2017   Total Score 13     Today's PILO-7 Score:   PILO-7 SCORE 2017   Total Score 5       Problem list and histories reviewed & adjusted, as indicated.  Additional history: as documented.    Patient Active Problem List   Diagnosis     Abnormal glandular Papanicolaou smear of cervix     Past Surgical History:   Procedure Laterality Date     BACK SURGERY      L5-S1 laminectomy     DILATION AND CURETTAGE, HYSTEROSCOPY, ABLATE ENDOMETRIUM VERSAPOINT, COMBINED N/A 2017    Procedure: COMBINED DILATION AND CURETTAGE, HYSTEROSCOPY, ABLATE ENDOMETRIUM VERSAPOINT;  DILATION AND CURETTAGE, HYSTEROSCOPY, POLYPECTOMY, ENDOMETRIAL ABLATION WITH VERSAPOINT;  Surgeon: Perry Urbina MD;  Location: [facility]     MAMMOPLASTY REDUCTION BILATERAL  2004     TONSILLECTOMY        Social History   Substance Use Topics     Smoking status: Never Smoker     Smokeless tobacco: Never Used      Alcohol use Yes      Comment: rarely      Problem (# of Occurrences) Relation (Name,Age of Onset)    Myocardial Infarction (1) Father: x2       Negative family history of: DIABETES            Current Outpatient Prescriptions   Medication Sig     metoprolol (TOPROL-XL) 50 MG 24 hr tablet Take 50 mg by mouth     furosemide (LASIX) 20 MG tablet Take 20 mg by mouth     loratadine (CLARITIN) 10 MG tablet Take 10 mg by mouth     Ranitidine HCl (ZANTAC PO) Take 1 tablet by mouth At Bedtime      No current facility-administered medications for this visit.      Allergies   Allergen Reactions     Sulfa Drugs Hives       ROS:  Psychiatric: Anxiety and Depression    OBJECTIVE:     /84  Wt 230 lb (104.3 kg)  LMP  (LMP Unknown)  Breastfeeding? No  BMI 41.07 kg/m2  Body mass index is 41.07 kg/(m^2).    Exam:  Constitutional:  Appearance: Well nourished, well developed alert, in no acute distress  No Pelvic Exam performed today    In-Clinic Test Results:      ASSESSMENT/PLAN:                                                      Patient with excellent follow-up visit from hysteroscopy polypectomy and ablation. We have asked her to report any bleeding cramping or abnormal discharge. She will return for her annual examination.          Perry Urbina MD  [facility] [place]

## 2017-11-07 NOTE — MR AVS SNAPSHOT
"              After Visit Summary   2017    Wendy Goltzman    MRN: [id number]           Patient Information     Date Of Birth          1960        Visit Information        Provider Department      2017 3:45 PM Perry Urbina MD [facility]        Today's Diagnoses     Endometrial polyp    -  1       Follow-ups after your visit        Who to contact     If you have questions or need follow up information about today's clinic visit or your schedule please contact [facility] directly at [phone].  Normal or non-critical lab and imaging results will be communicated to you by Greatshart, letter or phone within 4 business days after the clinic has received the results. If you do not hear from us within 7 days, please contact the clinic through Greatshart or phone. If you have a critical or abnormal lab result, we will notify you by phone as soon as possible.  Submit refill requests through [employer] or call your pharmacy and they will forward the refill request to us. Please allow 3 business days for your refill to be completed.          Additional Information About Your Visit        MyChart Information     [employer] lets you send messages to your doctor, view your test results, renew your prescriptions, schedule appointments and more. To sign up, go to [email or web address] . Click on \"Log in\" on the left side of the screen, which will take you to the Welcome page. Then click on \"Sign up Now\" on the right side of the page.     You will be asked to enter the access code listed below, as well as some personal information. Please follow the directions to create your username and password.     Your access code is: [email or web address]  Expires: 2018  4:14 PM     Your access code will  in 90 days. If you need help or a new code, please call your [place] clinic or [phone].        Care EveryWhere ID     This is your Care EveryWhere ID. This could be used by other " organizations to access your [place] medical records  [id number]        Your Vitals Were     Last Period Breastfeeding? BMI (Body Mass Index)             (LMP Unknown) No 41.07 kg/m2          Blood Pressure from Last 3 Encounters:   11/07/17 120/84   08/07/17 112/68   07/25/17 136/88    Weight from Last 3 Encounters:   11/07/17 230 lb (104.3 kg)   08/07/17 227 lb (103 kg)   07/25/17 229 lb 3.2 oz (104 kg)              Today, you had the following     No orders found for display       Primary Care Provider Office Phone # Fax #    Delfina Gonsaleschase [phone]       [facility] [address]  [place] [address]        Equal Access to Services     NALLELY MONTOYA : Hadii geremias billingsley hadasho Soomaali, waaxda luqadaha, qaybta kaalmada adeegyada, faustino dexter hayvelia guaman . So [facility] [phone].    ATENCIÓN: Si habla español, tiene a velazquez disposición servicios gratuitos de asistencia lingüística. Llame al [phone].    We comply with applicable federal civil rights laws and [place] laws. We do not discriminate on the basis of race, color, national origin, age, disability, sex, sexual orientation, or gender identity.            Thank you!     Thank you for choosing [facility]  for your care. Our goal is always to provide you with excellent care. Hearing back from our patients is one way we can continue to improve our services. Please take a few minutes to complete the written survey that you may receive in the mail after your visit with us. Thank you!             Your Updated Medication List - Protect others around you: Learn how to safely use, store and throw away your medicines at www.disposemymeds.org.          This list is accurate as of: 11/7/17  4:14 PM.  Always use your most recent med list.                   Brand Name Dispense Instructions for use Diagnosis    furosemide 20 MG tablet    LASIX     Take 20 mg by mouth        loratadine 10 MG tablet     CLARITIN     Take 10 mg by mouth        metoprolol 50 MG 24 hr tablet    TOPROL-XL     Take 50 mg by mouth        ZANTAC PO      Take 1 tablet by mouth At Bedtime

## 2018-03-12 ENCOUNTER — RADIANT APPOINTMENT (OUTPATIENT)
Dept: MAMMOGRAPHY | Facility: CLINIC | Age: 58
End: 2018-03-12
Payer: COMMERCIAL

## 2018-03-12 ENCOUNTER — OFFICE VISIT (OUTPATIENT)
Dept: OBGYN | Facility: CLINIC | Age: 58
End: 2018-03-12
Payer: COMMERCIAL

## 2018-03-12 VITALS
HEART RATE: 74 BPM | HEIGHT: 63 IN | WEIGHT: 231 LBS | BODY MASS INDEX: 40.93 KG/M2 | SYSTOLIC BLOOD PRESSURE: 106 MMHG | DIASTOLIC BLOOD PRESSURE: 64 MMHG

## 2018-03-12 DIAGNOSIS — Z12.31 VISIT FOR SCREENING MAMMOGRAM: ICD-10-CM

## 2018-03-12 DIAGNOSIS — Z01.419 ENCOUNTER FOR GYNECOLOGICAL EXAMINATION WITHOUT ABNORMAL FINDING: Primary | ICD-10-CM

## 2018-03-12 PROCEDURE — G0145 SCR C/V CYTO,THINLAYER,RESCR: HCPCS | Performed by: OBSTETRICS & GYNECOLOGY

## 2018-03-12 PROCEDURE — 77067 SCR MAMMO BI INCL CAD: CPT | Mod: TC

## 2018-03-12 PROCEDURE — 87624 HPV HI-RISK TYP POOLED RSLT: CPT | Performed by: OBSTETRICS & GYNECOLOGY

## 2018-03-12 PROCEDURE — 99396 PREV VISIT EST AGE 40-64: CPT | Performed by: OBSTETRICS & GYNECOLOGY

## 2018-03-12 ASSESSMENT — ANXIETY QUESTIONNAIRES
5. BEING SO RESTLESS THAT IT IS HARD TO SIT STILL: MORE THAN HALF THE DAYS
3. WORRYING TOO MUCH ABOUT DIFFERENT THINGS: SEVERAL DAYS
IF YOU CHECKED OFF ANY PROBLEMS ON THIS QUESTIONNAIRE, HOW DIFFICULT HAVE THESE PROBLEMS MADE IT FOR YOU TO DO YOUR WORK, TAKE CARE OF THINGS AT HOME, OR GET ALONG WITH OTHER PEOPLE: SOMEWHAT DIFFICULT
2. NOT BEING ABLE TO STOP OR CONTROL WORRYING: MORE THAN HALF THE DAYS
1. FEELING NERVOUS, ANXIOUS, OR ON EDGE: MORE THAN HALF THE DAYS
6. BECOMING EASILY ANNOYED OR IRRITABLE: NOT AT ALL
GAD7 TOTAL SCORE: 10
7. FEELING AFRAID AS IF SOMETHING AWFUL MIGHT HAPPEN: SEVERAL DAYS

## 2018-03-12 ASSESSMENT — PATIENT HEALTH QUESTIONNAIRE - PHQ9: 5. POOR APPETITE OR OVEREATING: MORE THAN HALF THE DAYS

## 2018-03-12 NOTE — LETTER
March 21, 2018    Wendy Goltzkenzie  [address] [address]    Dear Brigida,  We are happy to inform you that your PAP smear result from 3/12/18 is normal.  We are now able to do a follow up test on PAP smears. The DNA test is for HPV (Human Papilloma Virus). Cervical cancer is closely linked with certain types of HPV. Your result showed no evidence of high risk HPV.  Therefore we recommend you return in 3 years for your next pap smear.  You will still need to return to the clinic every year for an annual exam and other preventive tests.  Please contact the clinic at [phone] with any questions.  Sincerely,    Perry Urbina MD/tiffany

## 2018-03-12 NOTE — PROGRESS NOTES
Brigida is a 57 year old  female who presents for annual exam.     Besides routine health maintenance, she has no other health concerns today .    HPI: The patient is seen at this time for her annual exam.  She is postmenopausal and on no replacement therapy.  She continues to struggle with weight loss.  The patient's PCP is Delfina Garcia.        GYNECOLOGIC HISTORY:    No LMP recorded (lmp unknown). Patient is postmenopausal.  Her current contraception method is: not sexually active.  She  reports that she has never smoked. She has never used smokeless tobacco.    Patient is not sexually active.  STD testing offered?  Declined  Last PHQ-9 score on record =   PHQ-9 SCORE 3/12/2018   Total Score 8     Last GAD7 score on record =   PILO-7 SCORE 3/12/2018   Total Score 10     Alcohol Score = 1    HEALTH MAINTENANCE:  Cholesterol: 10/11/17   Total= 254, Triglycerides=165, HDL=58, CGM=389, (FBS=79, TSH=2.74)- both done on 17  Last Mammo: one year ago, Result: normal, Next Mammo: today   Pap:   Lab Results   Component Value Date    PAP NIL 2017    PAP NIL 2016    PAP NIL 2015      Colonoscopy:  , Result: normal, Next Colonoscopy: 4 years.  Dexa:  16 normal    Health maintenance updated:  yes    HISTORY:  Obstetric History       T2      L0     SAB0   TAB0   Ectopic0   Multiple0   Live Births0       # Outcome Date GA Lbr Rosendo/2nd Weight Sex Delivery Anes PTL Lv   2 Term            1 Term                   Patient Active Problem List   Diagnosis     Abnormal glandular Papanicolaou smear of cervix     Past Surgical History:   Procedure Laterality Date     BACK SURGERY      L5-S1 laminectomy     DILATION AND CURETTAGE, HYSTEROSCOPY, ABLATE ENDOMETRIUM VERSAPOINT, COMBINED N/A 2017    Procedure: COMBINED DILATION AND CURETTAGE, HYSTEROSCOPY, ABLATE ENDOMETRIUM VERSAPOINT;  DILATION AND CURETTAGE, HYSTEROSCOPY, POLYPECTOMY, ENDOMETRIAL ABLATION WITH VERSAPOINT;   "Surgeon: Perry Urbina MD;  Location: [facility]     MAMMOPLASTY REDUCTION BILATERAL  2004     TONSILLECTOMY        Social History   Substance Use Topics     Smoking status: Never Smoker     Smokeless tobacco: Never Used     Alcohol use Yes      Comment: rarely      Problem (# of Occurrences) Relation (Name,Age of Onset)    Myocardial Infarction (1) Father: x2       Negative family history of: DIABETES            Current Outpatient Prescriptions   Medication Sig     metoprolol (TOPROL-XL) 50 MG 24 hr tablet Take 50 mg by mouth     furosemide (LASIX) 20 MG tablet Take 20 mg by mouth     loratadine (CLARITIN) 10 MG tablet Take 10 mg by mouth     Ranitidine HCl (ZANTAC PO) Take 1 tablet by mouth At Bedtime      No current facility-administered medications for this visit.      Allergies   Allergen Reactions     Sulfa Drugs Hives       Past medical, surgical, social and family histories were reviewed and updated in EPIC.    ROS:   12 point review of systems negative other than symptoms noted below.  Constitutional: Weight Gain  Eyes: Spots  Head: Ringing  Cardiovascular: Lower Extremity Swelling  Gastrointestinal: Bloating, Constipation and Heartburn  Endocrine: Decreased Libido  Psychiatric: Anxiety and Depression    EXAM:  /64  Pulse 74  Ht 5' 2.5\" (1.588 m)  Wt 231 lb (104.8 kg)  LMP  (LMP Unknown)  BMI 41.58 kg/m2   BMI: Body mass index is 41.58 kg/(m^2).    PHYSICAL EXAM:  Constitutional:  Appearance: Well nourished, well developed, alert, in no acute distress  Neck:  Lymph Nodes:  No lymphadenopathy present    Thyroid:  Gland size normal, nontender, no nodules or masses present  on palpation  Chest:  Respiratory Effort:  Breathing unlabored  Cardiovascular:    Heart: Auscultation:  Regular rate, normal rhythm, no murmurs present  Breasts: Inspection of Breasts:  No lymphadenopathy present., Palpation of Breasts and Axillae:  No masses present on palpation, no breast tenderness., Axillary Lymph Nodes:  No " lymphadenopathy present. and No nodularity, asymmetry or nipple discharge bilaterally.  Gastrointestinal:   Abdominal Examination:  Abdomen nontender to palpation, tone normal without rigidity or guarding, no masses present, umbilicus without lesions   Liver and Spleen:  No hepatomegaly present, liver nontender to palpation    Hernias:  No hernias present  Lymphatic: Lymph Nodes:  No other lymphadenopathy present  Skin:  General Inspection:  No rashes present, no lesions present, no areas of  discoloration    Genitalia and Groin:  No rashes present, no lesions present, no areas of  discoloration, no masses present  Neurologic/Psychiatric:    Mental Status:  Oriented X3     Pelvic Exam:  External Genitalia:     Normal appearance for age, no discharge present, no tenderness present, no inflammatory lesions present, color normal  Vagina:     Normal vaginal vault without central or paravaginal defects, ATROPHIC  Bladder:     Nontender to palpation  Urethra:   Urethral Body:  Urethra palpation normal, urethra structural support normal   Urethral Meatus:  No erythema or lesions present  Cervix:     Appearance healthy, no lesions present, nontender to palpation, no bleeding present  Uterus:     Nontender to palpation, no masses present, position anteflexed, mobility: normal  Adnexa:     No adnexal tenderness present, no adnexal masses present  Perineum:     Perineum within normal limits, no evidence of trauma, no rashes or skin lesions present  Inguinal Lymph Nodes:     No lymphadenopathy present      COUNSELING:   Reviewed preventive health counseling, as reflected in patient instructions       Regular exercise       Healthy diet/nutrition    BMI: Body mass index is 41.58 kg/(m^2).  Weight management plan: Discussed healthy diet and exercise guidelines and patient will follow up in 12 months in clinic to re-evaluate.    ASSESSMENT:  57 year old female with satisfactory annual exam.    ICD-10-CM    1. Encounter for  gynecological examination without abnormal finding Z01.419 Pap imaged thin layer screen with HPV - recommended age 30 - 65     HPV High Risk Types DNA Cervical       PLAN: The patient is seen at this time for annual exam.  She has some family issues that are bothering her but she did not elaborate.  We will contact her with her results.  We will see her in 1 year.      Perry Urbina MD

## 2018-03-12 NOTE — MR AVS SNAPSHOT
"              After Visit Summary   3/12/2018    Wendy Goltzman    MRN: [id number]           Patient Information     Date Of Birth          1960        Visit Information        Provider Department      3/12/2018 1:30 PM Perry Urbina MD [facility]        Today's Diagnoses     Encounter for gynecological examination without abnormal finding    -  1       Follow-ups after your visit        Who to contact     If you have questions or need follow up information about today's clinic visit or your schedule please contact [facility] directly at [phone].  Normal or non-critical lab and imaging results will be communicated to you by Frontierre[employer], letter or phone within 4 business days after the clinic has received the results. If you do not hear from us within 7 days, please contact the clinic through Frontierre[employer] or phone. If you have a critical or abnormal lab result, we will notify you by phone as soon as possible.  Submit refill requests through [employer] or call your pharmacy and they will forward the refill request to us. Please allow 3 business days for your refill to be completed.          Additional Information About Your Visit        MyChart Information     [employer] lets you send messages to your doctor, view your test results, renew your prescriptions, schedule appointments and more. To sign up, go to [email or web address] . Click on \"Log in\" on the left side of the screen, which will take you to the Welcome page. Then click on \"Sign up Now\" on the right side of the page.     You will be asked to enter the access code listed below, as well as some personal information. Please follow the directions to create your username and password.     Your access code is: [email or web address]  Expires: 6/10/2018  2:08 PM     Your access code will  in 90 days. If you need help or a new code, please call your [facility] or [phone].        Care EveryWhere ID     This is your " "Care EveryWhere ID. This could be used by other organizations to access your [place] medical records  [id number]        Your Vitals Were     Pulse Height Last Period BMI (Body Mass Index)          74 5' 2.5\" (1.588 m) (LMP Unknown) 41.58 kg/m2         Blood Pressure from Last 3 Encounters:   03/12/18 106/64   11/07/17 120/84   08/07/17 112/68    Weight from Last 3 Encounters:   03/12/18 231 lb (104.8 kg)   11/07/17 230 lb (104.3 kg)   08/07/17 227 lb (103 kg)              We Performed the Following     HPV High Risk Types DNA Cervical     Pap imaged thin layer screen with HPV - recommended age 30 - 65        Primary Care Provider Office Phone # Fax #    Delfina Garcia [phone]       [facility] [address]  [place] [address]        Equal Access to Services     NALLELY MONTOYA : Hadii geremias patelo Soarchana, waaxda luqadaha, qaybta kaalmada adeegyada, faustino guaman . So [facility] [phone].    ATENCIÓN: Si tono davis, tiene a velazquez disposición servicios gratuitos de asistencia lingüística. Llame al [phone].    We comply with applicable federal civil rights laws and [place] laws. We do not discriminate on the basis of race, color, national origin, age, disability, sex, sexual orientation, or gender identity.            Thank you!     Thank you for choosing [facility] [place]  for your care. Our goal is always to provide you with excellent care. Hearing back from our patients is one way we can continue to improve our services. Please take a few minutes to complete the written survey that you may receive in the mail after your visit with us. Thank you!             Your Updated Medication List - Protect others around you: Learn how to safely use, store and throw away your medicines at www.disposemymeds.org.          This list is accurate as of 3/12/18  2:08 PM.  Always use your most recent med list.                   Brand Name Dispense " Instructions for use Diagnosis    furosemide 20 MG tablet    LASIX     Take 20 mg by mouth        loratadine 10 MG tablet    CLARITIN     Take 10 mg by mouth        metoprolol succinate 50 MG 24 hr tablet    TOPROL-XL     Take 50 mg by mouth        ZANTAC PO      Take 1 tablet by mouth At Bedtime

## 2018-03-13 ASSESSMENT — ANXIETY QUESTIONNAIRES: GAD7 TOTAL SCORE: 10

## 2018-03-13 ASSESSMENT — PATIENT HEALTH QUESTIONNAIRE - PHQ9: SUM OF ALL RESPONSES TO PHQ QUESTIONS 1-9: 8

## 2018-03-14 LAB
COPATH REPORT: NORMAL
PAP: NORMAL

## 2018-03-15 LAB
FINAL DIAGNOSIS: NORMAL
HPV HR 12 DNA CVX QL NAA+PROBE: NEGATIVE
HPV16 DNA SPEC QL NAA+PROBE: NEGATIVE
HPV18 DNA SPEC QL NAA+PROBE: NEGATIVE
SPECIMEN DESCRIPTION: NORMAL
SPECIMEN SOURCE CVX/VAG CYTO: NORMAL

## 2019-04-01 ENCOUNTER — OFFICE VISIT (OUTPATIENT)
Dept: OBGYN | Facility: CLINIC | Age: 59
End: 2019-04-01
Attending: OBSTETRICS & GYNECOLOGY
Payer: COMMERCIAL

## 2019-04-01 ENCOUNTER — HOSPITAL ENCOUNTER (OUTPATIENT)
Dept: MAMMOGRAPHY | Facility: CLINIC | Age: 59
Discharge: HOME | End: 2019-04-01
Attending: OBSTETRICS & GYNECOLOGY | Admitting: OBSTETRICS & GYNECOLOGY
Payer: COMMERCIAL

## 2019-04-01 VITALS
DIASTOLIC BLOOD PRESSURE: 78 MMHG | WEIGHT: 210 LBS | BODY MASS INDEX: 37.21 KG/M2 | SYSTOLIC BLOOD PRESSURE: 132 MMHG | HEIGHT: 63 IN

## 2019-04-01 DIAGNOSIS — Z12.31 VISIT FOR SCREENING MAMMOGRAM: ICD-10-CM

## 2019-04-01 DIAGNOSIS — Z01.419 ENCOUNTER FOR GYNECOLOGICAL EXAMINATION WITHOUT ABNORMAL FINDING: Primary | ICD-10-CM

## 2019-04-01 PROCEDURE — 87624 HPV HI-RISK TYP POOLED RSLT: CPT | Performed by: OBSTETRICS & GYNECOLOGY

## 2019-04-01 PROCEDURE — 77063 BREAST TOMOSYNTHESIS BI: CPT

## 2019-04-01 PROCEDURE — G0145 SCR C/V CYTO,THINLAYER,RESCR: HCPCS | Performed by: OBSTETRICS & GYNECOLOGY

## 2019-04-01 PROCEDURE — 99396 PREV VISIT EST AGE 40-64: CPT | Performed by: OBSTETRICS & GYNECOLOGY

## 2019-04-01 ASSESSMENT — ANXIETY QUESTIONNAIRES
2. NOT BEING ABLE TO STOP OR CONTROL WORRYING: NOT AT ALL
IF YOU CHECKED OFF ANY PROBLEMS ON THIS QUESTIONNAIRE, HOW DIFFICULT HAVE THESE PROBLEMS MADE IT FOR YOU TO DO YOUR WORK, TAKE CARE OF THINGS AT HOME, OR GET ALONG WITH OTHER PEOPLE: NOT DIFFICULT AT ALL
7. FEELING AFRAID AS IF SOMETHING AWFUL MIGHT HAPPEN: NOT AT ALL
5. BEING SO RESTLESS THAT IT IS HARD TO SIT STILL: NOT AT ALL
3. WORRYING TOO MUCH ABOUT DIFFERENT THINGS: NOT AT ALL
GAD7 TOTAL SCORE: 0
6. BECOMING EASILY ANNOYED OR IRRITABLE: NOT AT ALL
1. FEELING NERVOUS, ANXIOUS, OR ON EDGE: NOT AT ALL

## 2019-04-01 ASSESSMENT — MIFFLIN-ST. JEOR: SCORE: 1498.74

## 2019-04-01 ASSESSMENT — PATIENT HEALTH QUESTIONNAIRE - PHQ9
SUM OF ALL RESPONSES TO PHQ QUESTIONS 1-9: 5
5. POOR APPETITE OR OVEREATING: NOT AT ALL

## 2019-04-01 NOTE — LETTER
April 9, 2019    Wendy Goltzman  [address] [address] [address]    Dear Ms.Goltzman,  This letter is regarding your recent Pap smear (cervical cancer screening) and Human Papillomavirus (HPV) test.  We are happy to inform you that your Pap smear result is normal. Cervical cancer is closely linked with certain types of HPV. Your results showed no evidence of high-risk HPV.  Therefore we recommend you return in 3 years for your next pap smear.  You will still need to return to the clinic every year for an annual exam and other preventive tests.  If you have additional questions regarding this result, please call our registered nurse, [place] at [phone].  Sincerely,    Perry Urbina MD/[facility]

## 2019-04-01 NOTE — PROGRESS NOTES
Brigida is a 58 year old  female who presents for annual exam.     Besides routine health maintenance, she has no other health concerns today .    HPI: The patient is seen at this time for her annual exam.  She has no change in her health history but recently lost her sister to advanced gastric carcinoma.  The patient's PCP is Delfina Gacria.          GYNECOLOGIC HISTORY:    No LMP recorded (lmp unknown). Patient is postmenopausal.  Her current contraception method is: menopause.  She  reports that  has never smoked. she has never used smokeless tobacco.    Patient is sexually active.  STD testing offered?  Declined  Last PHQ-9 score on record =   PHQ-9 SCORE 3/12/2018   PHQ-9 Total Score 8     Last GAD7 score on record =   PILO-7 SCORE 3/12/2018   Total Score 10     Alcohol Score = 1    HEALTH MAINTENANCE:  Cholesterol: 18  Total= 227, Triglycerides=146, HDL=50, NZQ=771, FBS=73  Last Mammo: one year ago, Result: normal, Next Mammo: today   Pap:   Lab Results   Component Value Date    PAP NIL 2018    PAP NIL 2017    PAP NIL 2016    3/12/18 WNL HPV (-)neg  Colonoscopy:  Pt unsure of date but thinks it was in the last couple years, Result: polyp, Next Colonoscopy: every 5 years.  Dexa:  16    Health maintenance updated:  yes    HISTORY:  Obstetric History       T2      L0     SAB0   TAB0   Ectopic0   Multiple0   Live Births0       # Outcome Date GA Lbr Rosendo/2nd Weight Sex Delivery Anes PTL Lv   2 Term            1 Term                   Patient Active Problem List   Diagnosis     ASCUS of cervix with negative high risk HPV     Past Surgical History:   Procedure Laterality Date     BACK SURGERY      L5-S1 laminectomy     DILATION AND CURETTAGE, HYSTEROSCOPY, ABLATE ENDOMETRIUM VERSAPOINT, COMBINED N/A 2017    Procedure: COMBINED DILATION AND CURETTAGE, HYSTEROSCOPY, ABLATE ENDOMETRIUM VERSAPOINT;  DILATION AND CURETTAGE, HYSTEROSCOPY, POLYPECTOMY, ENDOMETRIAL  "ABLATION WITH VERSAPOINT;  Surgeon: Perry Urbina MD;  Location: [facility]     MAMMOPLASTY REDUCTION BILATERAL  2004     TONSILLECTOMY        Social History     Tobacco Use     Smoking status: Never Smoker     Smokeless tobacco: Never Used   Substance Use Topics     Alcohol use: Yes     Comment: rarely      Problem (# of Occurrences) Relation (Name,Age of Onset)    Myocardial Infarction (1) Father: x2       Negative family history of: Diabetes            Current Outpatient Medications   Medication Sig     furosemide (LASIX) 20 MG tablet Take 20 mg by mouth     loratadine (CLARITIN) 10 MG tablet Take 10 mg by mouth     Ranitidine HCl (ZANTAC PO) Take 1 tablet by mouth At Bedtime      No current facility-administered medications for this visit.      Allergies   Allergen Reactions     Sulfa Drugs Hives       Past medical, surgical, social and family histories were reviewed and updated in EPIC.    ROS:   12 point review of systems negative other than symptoms noted below.  Eyes: Spots  Head: Ringing  Gastrointestinal: Heartburn  Skin: New Skin Lesions  Psychiatric: Depression    EXAM:  /78   Ht 1.588 m (5' 2.5\")   Wt 95.3 kg (210 lb)   LMP  (LMP Unknown)   Breastfeeding? No   BMI 37.80 kg/m     BMI: Body mass index is 37.8 kg/m .    PHYSICAL EXAM:  Constitutional:  Appearance: Well nourished, well developed, alert, in no acute distress  Neck:  Lymph Nodes:  No lymphadenopathy present    Thyroid:  Gland size normal, nontender, no nodules or masses present  on palpation  Chest:  Respiratory Effort:  Breathing unlabored  Cardiovascular:    Heart: Auscultation:  Regular rate, normal rhythm, no murmurs present  Breasts: Inspection of Breasts:  No lymphadenopathy present., Palpation of Breasts and Axillae:  No masses present on palpation, no breast tenderness., Axillary Lymph Nodes:  No lymphadenopathy present. and No nodularity, asymmetry or nipple discharge bilaterally.  Gastrointestinal:   Abdominal Examination: "  Abdomen nontender to palpation, tone normal without rigidity or guarding, no masses present, umbilicus without lesions   Liver and Spleen:  No hepatomegaly present, liver nontender to palpation    Hernias:  No hernias present  Lymphatic: Lymph Nodes:  No other lymphadenopathy present  Skin:  General Inspection:  No rashes present, no lesions present, no areas of  discoloration    Genitalia and Groin:  No rashes present, no lesions present, no areas of  discoloration, no masses present  Neurologic/Psychiatric:    Mental Status:  Oriented X3     Pelvic Exam:  External Genitalia:     Normal appearance for age, no discharge present, no tenderness present, no inflammatory lesions present, color normal  Vagina:     Normal vaginal vault without central or paravaginal defects, ATROPHIC  Bladder:     Nontender to palpation  Urethra:   Urethral Body:  Urethra palpation normal, urethra structural support normal   Urethral Meatus:  No erythema or lesions present  Cervix:     Appearance healthy, no lesions present, nontender to palpation, no bleeding present  Uterus:     Nontender to palpation, no masses present, position anteflexed, mobility: normal  Adnexa:     No adnexal tenderness present, no adnexal masses present  Perineum:     Perineum within normal limits, no evidence of trauma, no rashes or skin lesions present  Inguinal Lymph Nodes:     No lymphadenopathy present      COUNSELING:   Reviewed preventive health counseling, as reflected in patient instructions       Regular exercise       Healthy diet/nutrition    BMI: Body mass index is 37.8 kg/m .  Weight management plan: Discussed healthy diet and exercise guidelines    ASSESSMENT:  58 year old female with satisfactory annual exam.    ICD-10-CM    1. Encounter for gynecological examination without abnormal finding Z01.419        PLAN: We will convey the patient's Pap and mammogram results when available.  We have encouraged her to exercise and try to drop some  weight.    Perry Urbina MD

## 2019-04-02 ASSESSMENT — ANXIETY QUESTIONNAIRES: GAD7 TOTAL SCORE: 0

## 2019-04-04 LAB
COPATH REPORT: NORMAL
PAP: NORMAL

## 2019-06-09 ENCOUNTER — APPOINTMENT (OUTPATIENT)
Dept: ULTRASOUND IMAGING | Facility: CLINIC | Age: 59
End: 2019-06-09
Attending: EMERGENCY MEDICINE
Payer: COMMERCIAL

## 2019-06-09 ENCOUNTER — HOSPITAL ENCOUNTER (EMERGENCY)
Facility: CLINIC | Age: 59
Discharge: HOME | End: 2019-06-09
Attending: EMERGENCY MEDICINE | Admitting: EMERGENCY MEDICINE
Payer: COMMERCIAL

## 2019-06-09 VITALS
RESPIRATION RATE: 18 BRPM | DIASTOLIC BLOOD PRESSURE: 102 MMHG | TEMPERATURE: 97 F | SYSTOLIC BLOOD PRESSURE: 141 MMHG | HEART RATE: 74 BPM | OXYGEN SATURATION: 97 %

## 2019-06-09 DIAGNOSIS — L03.116 LEFT LEG CELLULITIS: ICD-10-CM

## 2019-06-09 PROCEDURE — 93971 EXTREMITY STUDY: CPT | Mod: LT

## 2019-06-09 PROCEDURE — 99284 EMERGENCY DEPT VISIT MOD MDM: CPT | Mod: 25

## 2019-06-09 PROCEDURE — 25000132 ZZH RX MED GY IP 250 OP 250 PS 637: Performed by: EMERGENCY MEDICINE

## 2019-06-09 RX ORDER — DOXYCYCLINE 100 MG/1
100 CAPSULE ORAL ONCE
Status: COMPLETED | OUTPATIENT
Start: 2019-06-09 | End: 2019-06-09

## 2019-06-09 RX ORDER — DOXYCYCLINE 100 MG/1
100 CAPSULE ORAL 2 TIMES DAILY
Qty: 14 CAPSULE | Refills: 0 | Status: SHIPPED | OUTPATIENT
Start: 2019-06-09 | End: 2019-06-16

## 2019-06-09 RX ADMIN — DOXYCYCLINE HYCLATE 100 MG: 100 CAPSULE ORAL at 13:25

## 2019-06-09 ASSESSMENT — ENCOUNTER SYMPTOMS
COLOR CHANGE: 1
FEVER: 1

## 2019-06-09 NOTE — ED TRIAGE NOTES
ABCs intact. Pt had a fever Friday night into Saturday. Pt c/o L leg redness and pain starting today.

## 2019-06-09 NOTE — ED AVS SNAPSHOT
[facility] Emergency Department  [address]  [facility] [address]  Phone:  [phone]  Fax:  [phone]                                    Wendy Goltzman   MRN: [id number]    Department:  [facility] Emergency Department   Date of Visit:  6/9/2019           After Visit Summary Signature Page    I have received my discharge instructions, and my questions have been answered. I have discussed any challenges I see with this plan with the nurse or doctor.    ..........................................................................................................................................  Patient/Patient Representative Signature      ..........................................................................................................................................  Patient Representative Print Name and Relationship to Patient    ..................................................               ................................................  Date                                   Time    ..........................................................................................................................................  Reviewed by Signature/Title    ...................................................              ..............................................  Date                                               Time          22EPIC Rev 08/18

## 2019-06-09 NOTE — ED PROVIDER NOTES
History     Chief Complaint:  Leg Redness    HPI   Wendy Goltzman is a 58 year old female with a history of hypertension who presents for a leg redness The patient reports that this morning she noticed increased redness to her left anterior shin. She states she has a history of staph infection and is concerned for infection, prompting her presentation to the ED for evaluation. She notes she has had fevers the last couple days and when she measured her temperature this morning it was 99.2. She also reports she does have some swelling to her foot and leg, but notes she always has some swelling at baseline. She has no history of blood clots.     Allergies:  Sulfa Drugs    Medications:    Lasix  Claritin  Zantac    Past Medical History:    Gastroesophageal reflux disease   Hypertension   Pure hypercholesterolemia   Sleep apnea    Past Surgical History:    L5-S1 laminectomy  Dilation and curettage, hysteroscopy, ablate endometrium versapoint, combined  Mammoplasty reduction bilateral  Tonsillectomy    Family History:    MI  Stomach cancer  Hypothyroidism  Dementia    Social History:  The patient presents to the ED alone.  Marital status:   Smoking status: Never Smoker  Alcohol use: Yes  Drug use: No    Review of Systems   Constitutional: Positive for fever.   Cardiovascular: Positive for leg swelling.   Skin: Positive for color change (redness).   All other systems reviewed and are negative.    Physical Exam     Patient Vitals for the past 24 hrs:   BP Temp Temp src Pulse Resp SpO2   06/09/19 1327 (!) 141/102 -- -- 74 -- --   06/09/19 1126 (!) 157/107 97  F (36.1  C) Temporal 71 18 97 %       Physical Exam  General: Patient is alert and interactive when I enter the room  Head:  The scalp, face, and head appear normal  Eyes:  The pupils are equal, round, and reactive to light    Conjunctivae and sclerae are normal  ENT:    External acoustic canals are normal    The oropharynx is normal without erythema.     Uvula  is in the midline  Neck:  Normal range of motion  CV:  Regular rate. S1/S2. No murmurs.   Resp:  Lungs are clear without wheezes or rales. No distress  GI:  Abdomen is soft, no rigidity, guarding, or rebound    No distension. No tenderness to palpation in any quadrant.     MS:  Normal tone. Joints grossly normal without effusions.     No asymmetric leg swelling, calf or thigh tenderness.      Normal motor assessment of all extremities.  Skin:  Normal capillary refill noted. Redness around the leg with scattered areas of red, raised papules consistent with cellulitis. Warm to touch. No necrosis or abnormal areas of sensation. See pictures below.   Neuro:  Speech is normal and fluent. Face is symmetric.     Moving all extremities well.   Psych:  Awake. Alert.  Normal affect.  Appropriate interactions.  Lymph: No anterior cervical lymphadenopathy noted                  Emergency Department Course       Imaging:  Radiographic findings were communicated with the patient who voiced understanding of the findings.    US LOWER EXTREMITY VENOUS DUPLEX LEFT:  IMPRESSION: No evidence of deep venous thrombosis within the left  lower extremity.  As read by Radiology.     Interventions:  1325: Doxycycline 100 mg PO    Emergency Department Course:  Past medical records, nursing notes, and vitals reviewed.  1138: I performed an exam of the patient and obtained history, as documented above.   The patient was sent for a lower extremity ultrasound while in the emergency department, findings above.    1249: I rechecked the patient and explained the findings.    Findings and plan explained to the patient. Patient discharged home with instructions regarding supportive care and reasons to return. The importance of close follow-up was reviewed.    Impression & Plan      Medical Decision Making:  Wendy Goltzman is a 58 year old female who presents for evaluation of skin redness. She had staph infection in the past per her report but I cannot  find if this was MRSA.     The history, physical exam and supporting data are consistent with cellulitis.  There do not appear at this time to be any complication of cellulitis including necrotizing fascitis, lymphangitis, lymphadenitis, abscess, osteomyelitis, sepsis, or shock.  The patient is not immunosuppressed. Lower extremity ultrasound of the leg was obtained and negative for DVT. Given the patient's overall well-appearance without any fevers or tachycardia, there no indication for blood work or further workup today. Supportive outpatient management is indicated with antibiotics. I will start the patient on Doxycycline. Close follow-up of primary care physician to ensure no progression and rapid resolution.  Area of cellulitis was outlined.  Cellulitis precautions for home.      Diagnosis:    ICD-10-CM   1. Left leg cellulitis L03.116       Disposition:  Discharged to home    Discharge Medications:  doxycycline hyclate 100 MG capsule  Commonly known as:  VIBRAMYCIN  100 mg, Oral, 2 TIMES DAILY            [facility] EMERGENCY DEPARTMENT    Scribe Disclosure:  Shadia MUNOZ, am serving as a scribe at 11:31 AM on 6/9/2019 to document services personally performed by Jorge Weber MD based on my observations and the provider's statements to me.        Jorge Weber MD  06/09/19 [id number]

## 2019-06-10 ENCOUNTER — HOSPITAL ENCOUNTER (EMERGENCY)
Facility: CLINIC | Age: 59
Discharge: HOME | End: 2019-06-10
Attending: EMERGENCY MEDICINE | Admitting: EMERGENCY MEDICINE
Payer: COMMERCIAL

## 2019-06-10 VITALS
TEMPERATURE: 97.6 F | OXYGEN SATURATION: 95 % | HEART RATE: 77 BPM | DIASTOLIC BLOOD PRESSURE: 100 MMHG | RESPIRATION RATE: 18 BRPM | SYSTOLIC BLOOD PRESSURE: 153 MMHG

## 2019-06-10 DIAGNOSIS — L03.116 CELLULITIS OF LEG, LEFT: ICD-10-CM

## 2019-06-10 PROCEDURE — 99282 EMERGENCY DEPT VISIT SF MDM: CPT

## 2019-06-10 ASSESSMENT — ENCOUNTER SYMPTOMS: FEVER: 1

## 2019-06-10 NOTE — ED PROVIDER NOTES
History     Chief Complaint:  Skin rash    The history is provided by the patient.      Wendy Goltzman is a 58 year old female who presents with a skin rash on the anterior of her left leg. Three days ago (6/7/2019), patient had a fever that continued for the next two days. Yesterday, patient stood up and felt a sudden onset of pain in her left leg and saw a rash when she looked down. She then went to the ED and was diagnosed with cellulitis. Patient was prescribed vibramycin and she took a dose yesterday at 1400, 2200, and today at 1000. However as the morning went on, her leg has gotten more swollen and the rash has moved to the posterior side of her calf. She tried seeing her PCP but was unable to. Patient states she started to get scared, prompting her to the ED today.    Allergies:  Sulfa drugs     Medications:    Vibramycin  Lasix  Claritin  Zantac    Past Medical History:    ASCUS of cervix   GERD  HTN  Sleep apnea    Psoriasis   Colon polyp  Hypercholesterolemia  Dermatitis and eczema    Past Surgical History:    L5-S1 laminectomy  D and C, hysteroscopy, ablation of endometrium, combined  Mammoplasty reduction   Tonsillectomy     Family History:    MI - father  Stomach cancer  Hypothyroidism   Dementia   Hyperlipidemia     Social History:  Negative for tobacco use.  Positive for alcohol use.   Negative for drug use.  Marital Status:  .     Review of Systems   Constitutional: Positive for fever (Resolved.).   Cardiovascular: Positive for leg swelling.   Musculoskeletal:        Positive for left leg pain.     Skin: Positive for rash (Left anterior and posterior calf. ).   All other systems reviewed and are negative.      Physical Exam     Patient Vitals for the past 24 hrs:   BP Temp Temp src Pulse Heart Rate Resp SpO2   06/10/19 1147 -- -- -- -- -- -- 96 %   06/10/19 1143 (!) 160/93 -- -- 68 -- -- --   06/10/19 1126 (!) 160/93 97.6  F (36.4  C) Temporal 66 66 18 100 %     Physical  Exam  Constitutional: Alert, attentive, GCS 15  HENT:    Nose: Nose normal.    Mouth/Throat: Oropharynx is clear, mucous membranes are moist  Eyes: EOM are normal, anicteric, conjugate gaze  CV: regular rate and rhythm; no murmurs  Chest: Effort normal and breath sounds clear without wheezing or rales, symmetric bilaterally   GI:  non tender. No distension. No guarding or rebound.    MSK: No LE edema, no tenderness to palpation of BLE.  Neurological: Alert, attentive, moving all extremities equally.   Skin: Left lower extremity hyperemia, blanchable with Newell to the touch with central areas of nonblanching erythema appears slightly improved from pictures the day prior.       Impression & Plan    Medical Decision Makin-year-old woman with past medical history significant for hypertension, on Lasix along with lower extremity edema presenting for left lower leg cellulitis.  Patient was concerned that the infection was not improving prompting her to return as she was unable to see her primary care doctor.  She was seen in the emergency department yesterday afternoon and initiated on doxycycline for which she has taken 3 doses. DVT ULT negative yesterday.  She does report her systemic symptoms namely today shows faint area of erythema within the lines demarcated yesterday suggestive of improvement.  She has no pain with passive range of motion and no severe pain with low suspicion for necrotizing fasciitis or vasculitis at this time.  I recommended continuation of her doxycycline, elevation of her leg and heat pack as needed for comfort.  Patient expressed understand this plan will see her primary care doctor in the next 24 to 48 hours.  She agreed to return should her symptoms worsen including fever, leg swelling, redness extending outside the demarcated margins.    Diagnosis:    ICD-10-CM    1. Cellulitis of leg, left L03.116        Disposition:  discharged to home    Discharge Medications:  None - on  doxycycline     Ted Serrano MD   [employer]  12:00 PM 06/10/19       Scribe Disposition  I, Tomasa Jewell, am serving as a scribe on 6/10/2019 at 11:56 AM to personally document services performed by Ted Martinez MD based on my observations and the provider's statements to me.     Tomasa Jewell  6/10/2019   [facility] EMERGENCY DEPARTMENT       Ted Serrano MD  06/10/19 [id number]

## 2019-06-10 NOTE — DISCHARGE INSTRUCTIONS
Continue to take your doxycycline as previously prescribed.  You should return to the emergency department should you have worsening redness, increasing pain or your fever recurs.  You should elevate your leg when able to help with the swelling.

## 2019-06-10 NOTE — ED TRIAGE NOTES
Pt here with left leg cellulitis, states she was seen yesterday and started on antibiotics but pain and redness worse. ABC's intact, alert and oriented x3.

## 2019-06-10 NOTE — ED AVS SNAPSHOT
[facility] Emergency Department  [address]  [facility] [address]  Phone:  [phone]  Fax:  [phone]                                    Wendy Goltzman   MRN: [id number]    Department:  [facility] Emergency Department   Date of Visit:  6/10/2019           After Visit Summary Signature Page    I have received my discharge instructions, and my questions have been answered. I have discussed any challenges I see with this plan with the nurse or doctor.    ..........................................................................................................................................  Patient/Patient Representative Signature      ..........................................................................................................................................  Patient Representative Print Name and Relationship to Patient    ..................................................               ................................................  Date                                   Time    ..........................................................................................................................................  Reviewed by Signature/Title    ...................................................              ..............................................  Date                                               Time          22EPIC Rev 08/18

## 2020-09-01 ENCOUNTER — HOSPITAL ENCOUNTER (EMERGENCY)
Facility: CLINIC | Age: 60
Discharge: HOME | End: 2020-09-01
Attending: EMERGENCY MEDICINE | Admitting: EMERGENCY MEDICINE
Payer: COMMERCIAL

## 2020-09-01 VITALS
TEMPERATURE: 99.9 F | WEIGHT: 212 LBS | OXYGEN SATURATION: 98 % | RESPIRATION RATE: 16 BRPM | SYSTOLIC BLOOD PRESSURE: 137 MMHG | BODY MASS INDEX: 38.16 KG/M2 | HEART RATE: 98 BPM | DIASTOLIC BLOOD PRESSURE: 81 MMHG

## 2020-09-01 DIAGNOSIS — L03.116 CELLULITIS OF LEFT LOWER EXTREMITY: ICD-10-CM

## 2020-09-01 PROCEDURE — 25000132 ZZH RX MED GY IP 250 OP 250 PS 637: Performed by: EMERGENCY MEDICINE

## 2020-09-01 PROCEDURE — 99283 EMERGENCY DEPT VISIT LOW MDM: CPT

## 2020-09-01 RX ORDER — DOXYCYCLINE 100 MG/1
100 CAPSULE ORAL 2 TIMES DAILY
Qty: 14 CAPSULE | Refills: 0 | Status: SHIPPED | OUTPATIENT
Start: 2020-09-01 | End: 2020-09-08

## 2020-09-01 RX ORDER — DOXYCYCLINE 100 MG/1
100 CAPSULE ORAL ONCE
Status: COMPLETED | OUTPATIENT
Start: 2020-09-01 | End: 2020-09-01

## 2020-09-01 RX ADMIN — DOXYCYCLINE HYCLATE 100 MG: 100 CAPSULE ORAL at 01:28

## 2020-09-01 ASSESSMENT — ENCOUNTER SYMPTOMS
NAUSEA: 0
CHILLS: 0
VOMITING: 0
FEVER: 1
COUGH: 0

## 2020-09-01 NOTE — ED AVS SNAPSHOT
[facility] Emergency Department  [address]  [facility] [address]  Phone:  [phone]  Fax:  [phone]                                    Wendy Goltzman   MRN: [id number]    Department:  [facility] Emergency Department   Date of Visit:  9/1/2020           After Visit Summary Signature Page    I have received my discharge instructions, and my questions have been answered. I have discussed any challenges I see with this plan with the nurse or doctor.    ..........................................................................................................................................  Patient/Patient Representative Signature      ..........................................................................................................................................  Patient Representative Print Name and Relationship to Patient    ..................................................               ................................................  Date                                   Time    ..........................................................................................................................................  Reviewed by Signature/Title    ...................................................              ..............................................  Date                                               Time          22EPIC Rev 08/18

## 2020-09-01 NOTE — ED TRIAGE NOTES
Pt reports wound to left lower extremity and home temp of 100.2 orally. Denies any other concerns or symptoms

## 2020-09-01 NOTE — ED NOTES
Redness marked with skin pen. Instructions provided. Prescription provided.  Pt verbalized understanding

## 2020-09-01 NOTE — ED PROVIDER NOTES
History     Chief Complaint:  Fever and Wound Check    HPI   Wendy Goltzman is a 59 year old female with a history of GERD, hypertension, and hypercholesterolemia who presents with a fever and wound check. The patient states that she began experiencing left leg redness a few weeks ago, prompting her to visit with a vascular surgeon. Today, she states that she began experiencing a fever as well as worsening redness, prompting her to present to the ED. She reports exercising every day and wearing compression stockings every day as well. She denies any chills, cough, nausea, vomiting, or any other symptoms. Of note, she had a similar episode and presentation for which she was seen here on 2019.    Allergies:  Sulfa drugs     Medications:    Lasix  Zantac  Protonix    Past Medical History:    GERD  Hypertension  Sleep apnea  Eczema  Hypercholesterolemia  Colon polyp    Past Surgical History:    L5-S1 laminectomy  Tonsillectomy  Breast reduction   section    Family History:    Gout - brother  Dementia - father  Heart disease - father  Hyperlipidemia - mother  MI - father  Stomach cancer - sister    Social History:  Smoking status: Never  Alcohol use: Yes  Drug use: No  PCP: Marianela Fontaine  Marital Status:   [2]     Review of Systems   Constitutional: Positive for fever. Negative for chills.   Respiratory: Negative for cough.    Gastrointestinal: Negative for nausea and vomiting.   Skin: Positive for rash (Left leg erythema ).   All other systems reviewed and are negative.      Physical Exam     Patient Vitals for the past 24 hrs:   BP Temp Temp src Pulse Resp SpO2 Weight   20 0021 137/81 99.9  F (37.7  C) Oral 98 16 98 % 96.2 kg (212 lb)       Physical Exam  General: Patient is alert and interactive when I enter the room  Head:  The scalp, face, and head appear normal  Eyes:  The pupils are equal, round, and reactive to light    Conjunctivae and sclerae are normal  ENT:    External acoustic  canals are normal    The oropharynx is normal without erythema.     Uvula is in the midline  Neck:  Normal range of motion  CV:  Regular rate. S1/S2. No murmurs.   Resp:  Lungs are clear without wheezes or rales. No distress  GI:  Abdomen is soft, no rigidity, guarding, or rebound    No distension. No tenderness to palpation in any quadrant.     MS:  Normal tone. Joints grossly normal without effusions.     No asymmetric leg swelling, calf or thigh tenderness.      Normal motor assessment of all extremities.  Skin:  Cellulitis is noted of the left lower shin, see picture below.  There is no blistering, necrosis, or other worrisome findings.  Normal capillary refill noted.  Mild bilateral lower extremity swelling.  Pulses in both ankles are normal.  Neuro: Speech is normal and fluent. Face is symmetric.     Moving all extremities well.   Psych:  Awake. Alert.  Normal affect.  Appropriate interactions.  Lymph: No anterior cervical lymphadenopathy noted        Emergency Department Course     Interventions:  0128 Vibramycin 100 mg PO    Emergency Department Course:  Past medical records, nursing notes, and vitals reviewed.    0107 I performed an exam of the patient as documented above.     Findings and plan explained to the Patient. Patient discharged home with instructions regarding supportive care, medications, and reasons to return. The importance of close follow-up was reviewed. The patient was prescribed Vibramycin.    Impression & Plan   CMS Diagnoses:      Medical Decision Making:  Wendy Goltzman is a 59 year old female who presents for evaluation of skin redness.  She has a history of the same and I saw her a year and a half ago and she did well on doxycycline.  She has a history as well of swelling and mild lymphedema and wears compression stockings.  The history, physical exam and supporting data are consistent with cellulitis.  There do not appear at this time to be any complication of cellulitis including  necrotizing fascitis, lymphangitis, lymphadenitis, abscess, osteomyelitis, sepsis, or shock.  The patient is not immunosuppressed.  Supportive outpatient management is indicated with antibiotics.  Close follow-up of primary care physician to ensure no progression and rapid resolution.  Area of cellulitis was outlined.  Cellulitis precautions for home.  We discussed about elevating her legs at home and doing ankle pumps exercises       Critical Care Time: was 0 minutes for this patient excluding procedures    Diagnosis:    ICD-10-CM    1. Cellulitis of left lower extremity  L03.116        Disposition:  Discharged to home.    Discharge Medications:  Discharge Medication List as of 9/1/2020  1:31 AM      START taking these medications    Details   doxycycline hyclate (VIBRAMYCIN) 100 MG capsule Take 1 capsule (100 mg) by mouth 2 times daily for 7 days, Disp-14 capsule,R-0, Local Print             Scribe Disclosure:  Sergo MUNOZ, am serving as a scribe at 1:07 AM on 9/1/2020 to document services personally performed by Jorge Weber MD based on my observations and the provider's statements to me.        Jorge Weber MD  09/01/20 0212

## 2020-10-16 NOTE — PROGRESS NOTES
Brigida is a 60 year old  female who presents for annual exam.     Besides routine health maintenance, she has no other health concerns today .    HPI: The patient is seen at this time for annual exam.  She is postmenopausal and on no replacement therapy.  She is working full-time at home.  She is exercising and losing weight.  Bone density is performed today.  The patient's PCP is Marianela Fontaine MD.        GYNECOLOGIC HISTORY:    No LMP recorded (lmp unknown). Patient is postmenopausal.    Her current contraception method is: menopause.  She  reports that she has never smoked. She has never used smokeless tobacco.    Patient is sexually active.  STD testing offered?  Declined  Last PHQ-9 score on record =   PHQ-9 SCORE 10/21/2020   PHQ-9 Total Score 1     Last GAD7 score on record =   PILO-7 SCORE 10/21/2020   Total Score 0     Alcohol Score = 0    HEALTH MAINTENANCE:  Cholesterol: (No results found for: CHOL)  Last Mammo: 19, Result: Normal, Next Mammo: Today   Pap: HPV NEGATIVE  Lab Results   Component Value Date    PAP NIL 2019    PAP NIL 2018    PAP NIL 2017      Colonoscopy:  , Result: Normal, Next Colonoscopy: .  Dexa:  16    Health maintenance updated:  no, due for mammogram    HISTORY:  OB History    Para Term  AB Living   3 2 2 0 1 2   SAB TAB Ectopic Multiple Live Births   1 0 0 0 2      # Outcome Date GA Lbr Rosendo/2nd Weight Sex Delivery Anes PTL Lv   3 SAB      AB, COMPLETE      2 Term      CS-LTranv   RAFITA   1 Term         RAFITA       Patient Active Problem List   Diagnosis     ASCUS of cervix with negative high risk HPV     Past Surgical History:   Procedure Laterality Date     BACK SURGERY      L5-S1 laminectomy     DILATION AND CURETTAGE, HYSTEROSCOPY, ABLATE ENDOMETRIUM VERSAPOINT, COMBINED N/A 2017    Procedure: COMBINED DILATION AND CURETTAGE, HYSTEROSCOPY, ABLATE ENDOMETRIUM VERSAPOINT;  DILATION AND CURETTAGE, HYSTEROSCOPY,  "POLYPECTOMY, ENDOMETRIAL ABLATION WITH VERSAPOINT;  Surgeon: Perry Urbina MD;  Location: [facility]     MAMMOPLASTY REDUCTION BILATERAL  2004     TONSILLECTOMY        Social History     Tobacco Use     Smoking status: Never Smoker     Smokeless tobacco: Never Used   Substance Use Topics     Alcohol use: Yes     Comment: rarely      Problem (# of Occurrences) Relation (Name,Age of Onset)    Myocardial Infarction (1) Father: x2    Stomach Cancer (1) Sister (49): dx'd and passed away 2018       Negative family history of: Diabetes            Current Outpatient Medications   Medication Sig     furosemide (LASIX) 20 MG tablet Take 20 mg by mouth     loratadine (CLARITIN) 10 MG tablet Take 10 mg by mouth     Nutritional Supplements (VITAMIN D BOOSTER PO)      pantoprazole (PROTONIX) 40 MG EC tablet Take 40 mg by mouth     No current facility-administered medications for this visit.      Allergies   Allergen Reactions     Molds & Smuts Other (See Comments)     Per allergy testing      Sulfa Drugs Hives       Past medical, surgical, social and family histories were reviewed and updated in EPIC.    ROS:   12 point review of systems negative other than symptoms noted below or in the HPI.  No urinary frequency or dysuria, bladder or kidney problems    EXAM:  /76 (BP Location: Right arm, Patient Position: Sitting, Cuff Size: Adult Large)   Pulse 60   Ht 1.588 m (5' 2.5\")   Wt 92.2 kg (203 lb 3.2 oz)   LMP  (LMP Unknown)   Breastfeeding No   BMI 36.57 kg/m     BMI: Body mass index is 36.57 kg/m .    PHYSICAL EXAM:  Constitutional:   Appearance: Well nourished, well developed, alert, in no acute distress  Neck:  Lymph Nodes:  No lymphadenopathy present    Thyroid:  Gland size normal, nontender, no nodules or masses present  on palpation  Chest:  Respiratory Effort:  Breathing unlabored  Cardiovascular:    Heart: Auscultation:  Regular rate, normal rhythm, no murmurs present  Breasts: Inspection of Breasts:  No " lymphadenopathy present., Palpation of Breasts and Axillae:  No masses present on palpation, no breast tenderness., Axillary Lymph Nodes:  No lymphadenopathy present. and No nodularity, asymmetry or nipple discharge bilaterally.  Bilateral reduction scars are excellent  Gastrointestinal:   Abdominal Examination:  Abdomen nontender to palpation, tone normal without rigidity or guarding, no masses present, umbilicus without lesions   Liver and Spleen:  No hepatomegaly present, liver nontender to palpation    Hernias:  No hernias present  Lymphatic: Lymph Nodes:  No other lymphadenopathy present  Skin:  General Inspection:  No rashes present, no lesions present, no areas of  discoloration  Neurologic:    Mental Status:  Oriented X3.  Normal strength and tone, sensory exam                grossly normal, mentation intact and speech normal.    Psychiatric:   Mentation appears normal and affect normal/bright.         Pelvic Exam:  External Genitalia:     Normal appearance for age, no discharge present, no tenderness present, no inflammatory lesions present, color normal  Vagina:     Normal vaginal vault without central or paravaginal defects, ATROPHIC  Bladder:     Nontender to palpation  Urethra:   Urethral Body:  Urethra palpation normal, urethra structural support normal   Urethral Meatus:  No erythema or lesions present  Cervix:     Appearance healthy, no lesions present, nontender to palpation, no bleeding present  Uterus:     Nontender to palpation, no masses present, position anteflexed, mobility: normal  Adnexa:     No adnexal tenderness present, no adnexal masses present  Perineum:     Perineum within normal limits, no evidence of trauma, no rashes or skin lesions present  Inguinal Lymph Nodes:     No lymphadenopathy present      COUNSELING:   Reviewed preventive health counseling, as reflected in patient instructions       Regular exercise       Healthy diet/nutrition    BMI: Body mass index is 36.57  kg/m .      ASSESSMENT:  60 year old female with satisfactory annual exam.  DEXA shows that she has drifted into osteopenia at the spine and still in the normal range at her hips.      PLAN: We will contact the patient with her screening and see her back next year.  She is on plan for increasing her activity and losing weight.  Dietary and supplemental calcium and vitamin D were discussed.      Perry Urbina MD

## 2020-10-21 ENCOUNTER — OFFICE VISIT (OUTPATIENT)
Dept: OBGYN | Facility: CLINIC | Age: 60
End: 2020-10-21
Payer: COMMERCIAL

## 2020-10-21 ENCOUNTER — ANCILLARY PROCEDURE (OUTPATIENT)
Dept: MAMMOGRAPHY | Facility: CLINIC | Age: 60
End: 2020-10-21
Payer: COMMERCIAL

## 2020-10-21 ENCOUNTER — ANCILLARY PROCEDURE (OUTPATIENT)
Dept: BONE DENSITY | Facility: CLINIC | Age: 60
End: 2020-10-21
Payer: COMMERCIAL

## 2020-10-21 VITALS
HEIGHT: 63 IN | SYSTOLIC BLOOD PRESSURE: 134 MMHG | WEIGHT: 203.2 LBS | HEART RATE: 60 BPM | BODY MASS INDEX: 36 KG/M2 | DIASTOLIC BLOOD PRESSURE: 76 MMHG

## 2020-10-21 DIAGNOSIS — Z12.31 VISIT FOR SCREENING MAMMOGRAM: ICD-10-CM

## 2020-10-21 DIAGNOSIS — Z01.419 ENCOUNTER FOR GYNECOLOGICAL EXAMINATION WITHOUT ABNORMAL FINDING: Primary | ICD-10-CM

## 2020-10-21 DIAGNOSIS — Z78.0 ASYMPTOMATIC POSTMENOPAUSAL STATE: ICD-10-CM

## 2020-10-21 PROCEDURE — 77080 DXA BONE DENSITY AXIAL: CPT | Performed by: OBSTETRICS & GYNECOLOGY

## 2020-10-21 PROCEDURE — 77067 SCR MAMMO BI INCL CAD: CPT | Performed by: RADIOLOGY

## 2020-10-21 PROCEDURE — 77063 BREAST TOMOSYNTHESIS BI: CPT | Performed by: RADIOLOGY

## 2020-10-21 PROCEDURE — 99396 PREV VISIT EST AGE 40-64: CPT | Performed by: OBSTETRICS & GYNECOLOGY

## 2020-10-21 PROCEDURE — 87624 HPV HI-RISK TYP POOLED RSLT: CPT | Performed by: OBSTETRICS & GYNECOLOGY

## 2020-10-21 PROCEDURE — G0145 SCR C/V CYTO,THINLAYER,RESCR: HCPCS | Performed by: OBSTETRICS & GYNECOLOGY

## 2020-10-21 RX ORDER — PANTOPRAZOLE SODIUM 40 MG/1
40 TABLET, DELAYED RELEASE ORAL
COMMUNITY
Start: 2020-09-03 | End: 2021-09-03

## 2020-10-21 ASSESSMENT — ANXIETY QUESTIONNAIRES
IF YOU CHECKED OFF ANY PROBLEMS ON THIS QUESTIONNAIRE, HOW DIFFICULT HAVE THESE PROBLEMS MADE IT FOR YOU TO DO YOUR WORK, TAKE CARE OF THINGS AT HOME, OR GET ALONG WITH OTHER PEOPLE: NOT DIFFICULT AT ALL
2. NOT BEING ABLE TO STOP OR CONTROL WORRYING: NOT AT ALL
7. FEELING AFRAID AS IF SOMETHING AWFUL MIGHT HAPPEN: NOT AT ALL
1. FEELING NERVOUS, ANXIOUS, OR ON EDGE: NOT AT ALL
6. BECOMING EASILY ANNOYED OR IRRITABLE: NOT AT ALL
GAD7 TOTAL SCORE: 0
3. WORRYING TOO MUCH ABOUT DIFFERENT THINGS: NOT AT ALL
5. BEING SO RESTLESS THAT IT IS HARD TO SIT STILL: NOT AT ALL

## 2020-10-21 ASSESSMENT — MIFFLIN-ST. JEOR: SCORE: 1457.9

## 2020-10-21 ASSESSMENT — PATIENT HEALTH QUESTIONNAIRE - PHQ9
5. POOR APPETITE OR OVEREATING: NOT AT ALL
SUM OF ALL RESPONSES TO PHQ QUESTIONS 1-9: 1

## 2020-10-21 NOTE — LETTER
October 29, 2020      Brigida Goltzman  [address] [facility] [id number]        Dear Ms.Goltzman,    We are happy to inform you that your recent Pap smear and Human Papillomavirus (HPV) test results are normal and negative.    It is recommended that you have your next Pap smear and Human Papillomavirus (HPV) test in 5 years. You will also need to return to the clinic every year for an annual wellness visit.    If you have additional questions regarding this result, please contact our office and we will be happy to assist you.      Sincerely,    Your [facility] Care Team

## 2020-10-22 ASSESSMENT — ANXIETY QUESTIONNAIRES: GAD7 TOTAL SCORE: 0

## 2020-10-23 LAB
COPATH REPORT: NORMAL
PAP: NORMAL

## 2021-11-02 NOTE — PROGRESS NOTES
Brigida is a 61 year old  female who presents for annual exam.     Besides routine health maintenance, she has no other health concerns today .    HPI: Patient is seen at this time for her annual exam.  She is postmenopausal and doing well.  She has recently been vaccinated and cannot get her mammogram at this time.  The patient's PCP is Marianela Fontaine MD.        GYNECOLOGIC HISTORY:    No LMP recorded (lmp unknown). Patient is postmenopausal.  Her current contraception method is: menopause.  She  reports that she has never smoked. She has never used smokeless tobacco.    Patient is sexually active.  STD testing offered?  Declined  Last PHQ-9 score on record =   PHQ-9 SCORE 11/3/2021   PHQ-9 Total Score 1     Last GAD7 score on record =   PILO-7 SCORE 11/3/2021   Total Score 4     Alcohol Score = 1    HEALTH MAINTENANCE:  Cholesterol: (No results found for: CHOL  Last Mammo: One year ago, Result: Normal, Next Mammo: Scheduled for Dec  Pap:   Lab Results   Component Value Date    PAP NIL, HPV- 10/21/2020    PAP NIL 2019    PAP NIL 2018      Colonoscopy:  , Result: Polyps, Next Colonoscopy: Has another one scheduled this Dec.  Dexa:  10/21/2020    Health maintenance updated:  yes    HISTORY:  OB History    Para Term  AB Living   3 2 2 0 1 2   SAB TAB Ectopic Multiple Live Births   1 0 0 0 2      # Outcome Date GA Lbr Rosendo/2nd Weight Sex Delivery Anes PTL Lv   3 SAB      AB, COMPLETE      2 Term      CS-LTranv   RAFITA   1 Term         RAFITA       Patient Active Problem List   Diagnosis     ASCUS of cervix with negative high risk HPV     Past Surgical History:   Procedure Laterality Date     BACK SURGERY      L5-S1 laminectomy     DILATION AND CURETTAGE, HYSTEROSCOPY, ABLATE ENDOMETRIUM VERSAPOINT, COMBINED N/A 2017    Procedure: COMBINED DILATION AND CURETTAGE, HYSTEROSCOPY, ABLATE ENDOMETRIUM VERSAPOINT;  DILATION AND CURETTAGE, HYSTEROSCOPY, POLYPECTOMY, ENDOMETRIAL ABLATION  "WITH [employer];  Surgeon: Perry Urbina MD;  Location: [facility]     MAMMOPLASTY REDUCTION BILATERAL  2004     TONSILLECTOMY        Social History     Tobacco Use     Smoking status: Never Smoker     Smokeless tobacco: Never Used   Substance Use Topics     Alcohol use: Yes     Comment: rarely      Problem (# of Occurrences) Relation (Name,Age of Onset)    Myocardial Infarction (1) Father: x2    Stomach Cancer (1) Sister (49): dx'd and passed away 2018       Negative family history of: Diabetes            Current Outpatient Medications   Medication Sig     calcium carbonate (TUMS) 500 MG chewable tablet Take 1 chew tab by mouth 2 times daily     furosemide (LASIX) 20 MG tablet Take 20 mg by mouth     loratadine (CLARITIN) 10 MG tablet Take 10 mg by mouth     Nutritional Supplements (VITAMIN D BOOSTER PO)      pantoprazole (PROTONIX) 40 MG EC tablet TAKE 1 TABLET BY MOUTH EVERY DAY     vitamin B-12 (CYANOCOBALAMIN) 1000 MCG tablet Take 1,000 mcg by mouth     No current facility-administered medications for this visit.     Allergies   Allergen Reactions     Molds & Smuts Other (See Comments)     Per allergy testing      Sulfa Drugs Hives       Past medical, surgical, social and family histories were reviewed and updated in EPIC.    ROS:   12 point review of systems negative other than symptoms noted below or in the HPI.  No urinary frequency or dysuria, bladder or kidney problems    EXAM:  /88   Pulse 60   Ht 1.562 m (5' 1.5\")   Wt 72.9 kg (160 lb 12.8 oz)   LMP  (LMP Unknown)   Breastfeeding No   BMI 29.89 kg/m     BMI: Body mass index is 29.89 kg/m .    PHYSICAL EXAM:  Constitutional:   Appearance: Well nourished, well developed, alert, in no acute distress  Neck:  Lymph Nodes:  No lymphadenopathy present    Thyroid:  Gland size normal, nontender, no nodules or masses present  on palpation  Chest:  Respiratory Effort:  Breathing unlabored  Cardiovascular:    Heart: Auscultation:  Regular rate, normal " rhythm, no murmurs present  Breasts: Inspection of Breasts:  No lymphadenopathy present., Palpation of Breasts and Axillae:  No masses present on palpation, no breast tenderness., Axillary Lymph Nodes:  No lymphadenopathy present. and No nodularity, asymmetry or nipple discharge bilaterally.  Gastrointestinal:   Abdominal Examination:  Abdomen nontender to palpation, tone normal without rigidity or guarding, no masses present, umbilicus without lesions   Liver and Spleen:  No hepatomegaly present, liver nontender to palpation    Hernias:  No hernias present  Lymphatic: Lymph Nodes:  No other lymphadenopathy present  Skin:  General Inspection:  No rashes present, no lesions present, no areas of  discoloration  Neurologic:    Mental Status:  Oriented X3.  Normal strength and tone, sensory exam                grossly normal, mentation intact and speech normal.    Psychiatric:   Mentation appears normal and affect normal/bright.         Pelvic Exam:  External Genitalia:     Normal appearance for age, no discharge present, no tenderness present, no inflammatory lesions present, color normal  Vagina:     Normal vaginal vault without central or paravaginal defects, ATROPHIC  Bladder:     Nontender to palpation  Urethra:   Urethral Body:  Urethra palpation normal, urethra structural support normal   Urethral Meatus:  No erythema or lesions present  Cervix:     Appearance healthy, no lesions present, nontender to palpation, no bleeding present  Uterus:     Nontender to palpation, no masses present, position anteflexed, mobility: normal  Adnexa:     No adnexal tenderness present, no adnexal masses present  Perineum:     Perineum within normal limits, no evidence of trauma, no rashes or skin lesions present  Inguinal Lymph Nodes:     No lymphadenopathy present      COUNSELING:   Reviewed preventive health counseling, as reflected in patient instructions       Regular exercise       Healthy diet/nutrition    BMI: Body mass  index is 29.89 kg/m .      ASSESSMENT:  61 year old female with satisfactory annual exam.    ICD-10-CM    1. Encounter for gynecological examination without abnormal finding  Z01.419        PLAN: We will convey the patient's screening test and she will continue having her GYN needs met by our office.      Perry Urbina MD

## 2021-11-03 ENCOUNTER — OFFICE VISIT (OUTPATIENT)
Dept: OBGYN | Facility: CLINIC | Age: 61
End: 2021-11-03
Payer: COMMERCIAL

## 2021-11-03 VITALS
HEIGHT: 62 IN | SYSTOLIC BLOOD PRESSURE: 128 MMHG | WEIGHT: 160.8 LBS | DIASTOLIC BLOOD PRESSURE: 88 MMHG | HEART RATE: 60 BPM | BODY MASS INDEX: 29.59 KG/M2

## 2021-11-03 DIAGNOSIS — Z01.419 ENCOUNTER FOR GYNECOLOGICAL EXAMINATION WITHOUT ABNORMAL FINDING: Primary | ICD-10-CM

## 2021-11-03 PROCEDURE — 99396 PREV VISIT EST AGE 40-64: CPT | Performed by: OBSTETRICS & GYNECOLOGY

## 2021-11-03 PROCEDURE — G0145 SCR C/V CYTO,THINLAYER,RESCR: HCPCS | Performed by: OBSTETRICS & GYNECOLOGY

## 2021-11-03 PROCEDURE — 87624 HPV HI-RISK TYP POOLED RSLT: CPT | Performed by: OBSTETRICS & GYNECOLOGY

## 2021-11-03 RX ORDER — CALCIUM CARBONATE 500 MG/1
1 TABLET, CHEWABLE ORAL 2 TIMES DAILY
COMMUNITY

## 2021-11-03 RX ORDER — PANTOPRAZOLE SODIUM 40 MG/1
TABLET, DELAYED RELEASE ORAL
COMMUNITY
Start: 2021-09-28

## 2021-11-03 ASSESSMENT — ANXIETY QUESTIONNAIRES
IF YOU CHECKED OFF ANY PROBLEMS ON THIS QUESTIONNAIRE, HOW DIFFICULT HAVE THESE PROBLEMS MADE IT FOR YOU TO DO YOUR WORK, TAKE CARE OF THINGS AT HOME, OR GET ALONG WITH OTHER PEOPLE: SOMEWHAT DIFFICULT
2. NOT BEING ABLE TO STOP OR CONTROL WORRYING: SEVERAL DAYS
7. FEELING AFRAID AS IF SOMETHING AWFUL MIGHT HAPPEN: NOT AT ALL
6. BECOMING EASILY ANNOYED OR IRRITABLE: NOT AT ALL
GAD7 TOTAL SCORE: 4
5. BEING SO RESTLESS THAT IT IS HARD TO SIT STILL: NOT AT ALL
1. FEELING NERVOUS, ANXIOUS, OR ON EDGE: SEVERAL DAYS
3. WORRYING TOO MUCH ABOUT DIFFERENT THINGS: SEVERAL DAYS

## 2021-11-03 ASSESSMENT — MIFFLIN-ST. JEOR: SCORE: 1244.69

## 2021-11-03 ASSESSMENT — PATIENT HEALTH QUESTIONNAIRE - PHQ9
SUM OF ALL RESPONSES TO PHQ QUESTIONS 1-9: 1
5. POOR APPETITE OR OVEREATING: SEVERAL DAYS

## 2021-11-04 ASSESSMENT — ANXIETY QUESTIONNAIRES: GAD7 TOTAL SCORE: 4

## 2021-11-08 LAB
BKR LAB AP GYN ADEQUACY: NORMAL
BKR LAB AP GYN INTERPRETATION: NORMAL
BKR LAB AP HPV REFLEX: NORMAL
BKR LAB AP PREVIOUS ABNORMAL: NORMAL
PATH REPORT.COMMENTS IMP SPEC: NORMAL
PATH REPORT.RELEVANT HX SPEC: NORMAL

## 2021-11-09 LAB
HUMAN PAPILLOMA VIRUS 16 DNA: NEGATIVE
HUMAN PAPILLOMA VIRUS 18 DNA: NEGATIVE
HUMAN PAPILLOMA VIRUS FINAL DIAGNOSIS: NORMAL
HUMAN PAPILLOMA VIRUS OTHER HR: NEGATIVE

## 2021-12-09 ENCOUNTER — ANCILLARY PROCEDURE (OUTPATIENT)
Dept: MAMMOGRAPHY | Facility: CLINIC | Age: 61
End: 2021-12-09
Attending: OBSTETRICS & GYNECOLOGY
Payer: COMMERCIAL

## 2021-12-09 DIAGNOSIS — Z12.31 VISIT FOR SCREENING MAMMOGRAM: ICD-10-CM

## 2021-12-09 PROCEDURE — 77063 BREAST TOMOSYNTHESIS BI: CPT | Mod: TC | Performed by: RADIOLOGY

## 2021-12-09 PROCEDURE — 77067 SCR MAMMO BI INCL CAD: CPT | Mod: TC | Performed by: RADIOLOGY

## 2021-12-20 ENCOUNTER — TELEPHONE (OUTPATIENT)
Dept: OBGYN | Facility: CLINIC | Age: 61
End: 2021-12-20
Payer: COMMERCIAL

## 2021-12-20 NOTE — TELEPHONE ENCOUNTER
Patient asked to speak with a nurse regarding her pap results from her appointment on 11/3/2021. Okay to leave a detailed voicemail.

## 2021-12-20 NOTE — TELEPHONE ENCOUNTER
Patient did not receive letter from pap results that was sent previously. Discussed results with patient. No further questions. Will mail letter to patients home address.      Marianela Ojeda RN

## 2022-05-08 ENCOUNTER — HOSPITAL ENCOUNTER (EMERGENCY)
Facility: CLINIC | Age: 62
Discharge: HOME | End: 2022-05-08
Attending: EMERGENCY MEDICINE | Admitting: EMERGENCY MEDICINE
Payer: COMMERCIAL

## 2022-05-08 ENCOUNTER — APPOINTMENT (OUTPATIENT)
Dept: GENERAL RADIOLOGY | Facility: CLINIC | Age: 62
End: 2022-05-08
Attending: EMERGENCY MEDICINE
Payer: COMMERCIAL

## 2022-05-08 VITALS
SYSTOLIC BLOOD PRESSURE: 124 MMHG | RESPIRATION RATE: 19 BRPM | DIASTOLIC BLOOD PRESSURE: 79 MMHG | HEART RATE: 64 BPM | OXYGEN SATURATION: 99 % | TEMPERATURE: 98.4 F

## 2022-05-08 DIAGNOSIS — U07.1 INFECTION DUE TO 2019 NOVEL CORONAVIRUS: ICD-10-CM

## 2022-05-08 LAB
ANION GAP SERPL CALCULATED.3IONS-SCNC: 6 MMOL/L (ref 3–14)
BASOPHILS # BLD AUTO: 0 10E3/UL (ref 0–0.2)
BASOPHILS NFR BLD AUTO: 0 %
BUN SERPL-MCNC: 13 MG/DL (ref 7–30)
CALCIUM SERPL-MCNC: 8.7 MG/DL (ref 8.5–10.1)
CHLORIDE BLD-SCNC: 103 MMOL/L (ref 94–109)
CO2 SERPL-SCNC: 31 MMOL/L (ref 20–32)
CREAT SERPL-MCNC: 0.71 MG/DL (ref 0.52–1.04)
EOSINOPHIL # BLD AUTO: 0.3 10E3/UL (ref 0–0.7)
EOSINOPHIL NFR BLD AUTO: 6 %
ERYTHROCYTE [DISTWIDTH] IN BLOOD BY AUTOMATED COUNT: 12.5 % (ref 10–15)
GFR SERPL CREATININE-BSD FRML MDRD: >90 ML/MIN/1.73M2
GLUCOSE BLD-MCNC: 83 MG/DL (ref 70–99)
HCT VFR BLD AUTO: 41.3 % (ref 35–47)
HGB BLD-MCNC: 13.2 G/DL (ref 11.7–15.7)
HOLD SPECIMEN: NORMAL
HOLD SPECIMEN: NORMAL
IMM GRANULOCYTES # BLD: 0 10E3/UL
IMM GRANULOCYTES NFR BLD: 0 %
LYMPHOCYTES # BLD AUTO: 2.3 10E3/UL (ref 0.8–5.3)
LYMPHOCYTES NFR BLD AUTO: 49 %
MCH RBC QN AUTO: 28.4 PG (ref 26.5–33)
MCHC RBC AUTO-ENTMCNC: 32 G/DL (ref 31.5–36.5)
MCV RBC AUTO: 89 FL (ref 78–100)
MONOCYTES # BLD AUTO: 0.5 10E3/UL (ref 0–1.3)
MONOCYTES NFR BLD AUTO: 11 %
NEUTROPHILS # BLD AUTO: 1.6 10E3/UL (ref 1.6–8.3)
NEUTROPHILS NFR BLD AUTO: 34 %
NRBC # BLD AUTO: 0 10E3/UL
NRBC BLD AUTO-RTO: 0 /100
PLATELET # BLD AUTO: 183 10E3/UL (ref 150–450)
POTASSIUM BLD-SCNC: 3.6 MMOL/L (ref 3.4–5.3)
RBC # BLD AUTO: 4.64 10E6/UL (ref 3.8–5.2)
SODIUM SERPL-SCNC: 140 MMOL/L (ref 133–144)
TROPONIN I SERPL HS-MCNC: <3 NG/L
WBC # BLD AUTO: 4.7 10E3/UL (ref 4–11)

## 2022-05-08 PROCEDURE — 80048 BASIC METABOLIC PNL TOTAL CA: CPT | Performed by: EMERGENCY MEDICINE

## 2022-05-08 PROCEDURE — 84484 ASSAY OF TROPONIN QUANT: CPT | Performed by: EMERGENCY MEDICINE

## 2022-05-08 PROCEDURE — 93005 ELECTROCARDIOGRAM TRACING: CPT

## 2022-05-08 PROCEDURE — 85025 COMPLETE CBC W/AUTO DIFF WBC: CPT | Performed by: EMERGENCY MEDICINE

## 2022-05-08 PROCEDURE — 71045 X-RAY EXAM CHEST 1 VIEW: CPT

## 2022-05-08 PROCEDURE — 99285 EMERGENCY DEPT VISIT HI MDM: CPT | Mod: 25

## 2022-05-08 PROCEDURE — 85379 FIBRIN DEGRADATION QUANT: CPT | Performed by: EMERGENCY MEDICINE

## 2022-05-08 PROCEDURE — 36415 COLL VENOUS BLD VENIPUNCTURE: CPT | Performed by: EMERGENCY MEDICINE

## 2022-05-08 ASSESSMENT — ENCOUNTER SYMPTOMS
FATIGUE: 1
SHORTNESS OF BREATH: 1
SORE THROAT: 1
FEVER: 0
GASTROINTESTINAL NEGATIVE: 1
COUGH: 1
CHEST TIGHTNESS: 1
PALPITATIONS: 0

## 2022-05-09 LAB
ATRIAL RATE - MUSE: 60 BPM
D DIMER PPP FEU-MCNC: 0.3 UG/ML FEU (ref 0–0.5)
DIASTOLIC BLOOD PRESSURE - MUSE: NORMAL MMHG
INTERPRETATION ECG - MUSE: NORMAL
P AXIS - MUSE: 41 DEGREES
PR INTERVAL - MUSE: 138 MS
QRS DURATION - MUSE: 82 MS
QT - MUSE: 404 MS
QTC - MUSE: 404 MS
R AXIS - MUSE: 40 DEGREES
SYSTOLIC BLOOD PRESSURE - MUSE: NORMAL MMHG
T AXIS - MUSE: 36 DEGREES
VENTRICULAR RATE- MUSE: 60 BPM

## 2022-05-09 NOTE — ED PROVIDER NOTES
History   Chief Complaint:  Chest Pain and Palpitations       HPI   Wendy J Goltzman is a 61 year old female with history of hypertension and acid reflux who presents with chest pain and palpitations.  She is vaccinated and double boosted for COVID but reports that she contracted a breakthrough infection 6 days ago.  She tested +3 days later on Thursday, May 5.  She has typical URI symptoms but notes while she typically exercises regularly, a couple days ago she started walking on a treadmill and developed some upper chest tightness and shortness of breath.  She continues to have some dyspnea on exertion contacted a nurse line [facility]  She has no personal or family history of PE or DVT.  She has no history of known heart or lung disease.    Review of Systems   Constitutional: Positive for fatigue. Negative for fever.   HENT: Positive for congestion and sore throat.    Respiratory: Positive for cough, chest tightness and shortness of breath.    Cardiovascular: Positive for chest pain. Negative for palpitations.   Gastrointestinal: Negative.    All other systems reviewed and are negative.    Allergies:  Molds & Smuts  Sulfa Drugs    Medications:  Lasix  Protonix  Claritin    Past Medical History:     GERD  HTN  RANDY    Past Surgical History:    Laminectomy  D and C  Mammoplasty reduction bilateral  Tonsillectomy      Family History:    Father - MI  Sister - stomach cancer    Social History:  Presents alone.  PCP: [facility]    Physical Exam     Patient Vitals for the past 24 hrs:   BP Temp Temp src Pulse Resp SpO2   05/08/22 2300 124/79 -- -- 64 19 --   05/08/22 2245 -- -- -- 68 21 --   05/08/22 2230 132/78 -- -- 63 19 --   05/08/22 2215 -- -- -- 60 16 --   05/08/22 2200 (!) 146/82 -- -- 61 13 --   05/08/22 2145 -- -- -- 65 18 --   05/08/22 2130 130/76 -- -- 63 19 --   05/08/22 2115 -- -- -- 58 17 --   05/08/22 2100 (!) 165/85 -- -- 59 13 --   05/08/22 2055 (!)  169/92 -- -- 82 -- --   05/08/22 1906 (!) 155/86 98.4  F (36.9  C) Temporal 69 20 99 %       Physical Exam  General: Patient is alert and cooperative.  HENT:  Wearing surgical mask.   Eyes: EOMI. Normal conjunctiva.  Neck:  Normal range of motion and appearance.   Cardiovascular:  Normal rate, regular rhythm and normal heart sounds.   Pulmonary/Chest:  Effort normal. No wheezing or crackles.  Abdominal: Soft. No distension or tenderness.     Musculoskeletal: Normal range of motion. No edema or tenderness.   Neurological: oriented, normal strength, sensation, and coordination.   Skin: Warm and dry. No rash or bruising.   Psychiatric: Normal mood and affect. Normal behavior and judgement.      Emergency Department Course   ECG  ECG obtained at 1910, ECG read at 2015  Normal sinus rhythm with sinus arrhythmia  Normal ECG   Rate 60 bpm. GA interval 138 ms. QRS duration 82 ms. QT/QTc 404/404 ms. P-R-T axes 41 40 36.     Imaging:  XR Chest Port 1 View   Final Result   IMPRESSION: Lungs are grossly clear. No consolidation. No pleural effusion. Normal heart size.              Report per radiology    Laboratory:  Labs Ordered and Resulted from Time of ED Arrival to Time of ED Departure   BASIC METABOLIC PANEL - Normal       Result Value    Sodium 140      Potassium 3.6      Chloride 103      Carbon Dioxide (CO2) 31      Anion Gap 6      Urea Nitrogen 13      Creatinine 0.71      Calcium 8.7      Glucose 83      GFR Estimate >90     TROPONIN I - Normal    Troponin I High Sensitivity <3     CBC WITH PLATELETS AND DIFFERENTIAL    WBC Count 4.7      RBC Count 4.64      Hemoglobin 13.2      Hematocrit 41.3      MCV 89      MCH 28.4      MCHC 32.0      RDW 12.5      Platelet Count 183      % Neutrophils 34      % Lymphocytes 49      % Monocytes 11      % Eosinophils 6      % Basophils 0      % Immature Granulocytes 0      NRBCs per 100 WBC 0      Absolute Neutrophils 1.6      Absolute Lymphocytes 2.3      Absolute Monocytes 0.5       Absolute Eosinophils 0.3      Absolute Basophils 0.0      Absolute Immature Granulocytes 0.0      Absolute NRBCs 0.0            Emergency Department Course:       Reviewed:  I reviewed nursing notes, vitals, past medical history and Care Everywhere    Assessments:  2020 I obtained history and examined the patient as noted above.    I rechecked the patient and explained findings.     Interventions:  Medications - No data to display    Disposition:  The patient was discharged to home.     Impression & Plan     Medical Decision Making:  Afebrile and hemodynamically stable 61-year-old female with acute uncomplicated COVID-19.  This is a breakthrough infection as she is vaccinated and double boosted.  She was referred to the emergency department by nurse triage line after reporting symptoms of some chest heaviness and shortness of breath with exertion.  She has an unremarkable physical exam and reassuring essentially completely negative work-up.  Chest x-ray, EKG, troponin, and D-dimer are within normal limits.  She is being discharged with reassurance and can follow-up with her clinic as needed.      Diagnosis:    ICD-10-CM    1. Infection due to 2019 novel coronavirus  U07.1        Discharge Medications:  Discharge Medication List as of 5/8/2022 11:06 PM          Scribe Disclosure:  I, Tanya Lind, am serving as a scribe at 8:10 PM on 5/8/2022 to document services personally performed by Sathish Santos MD based on my observations and the provider's statements to me.          Sathish Santos MD  05/09/22 [id number]

## 2022-05-09 NOTE — ED TRIAGE NOTES
Patient states she tested + for Covid on Thursday. Increased SOB and chest tightness with activity and palpitations over the last few days.   ABC intact alert and no distress.

## 2022-12-13 NOTE — PROGRESS NOTES
Brigida is a 62 year old  female who presents for annual exam.     Besides routine health maintenance, she has no other health concerns today .    HPI:  The patient's PCP is  Marianela Fontaine MD. patient here today for her annual GYN exam and mammogram.  She is postmenopausal and is status post endometrial ablation.  She has no vaginal bleeding.  She does have some urinary incontinence.    She had a normal Pap smear in .  Her history of abnormal was quite a while ago.    Her last DEXA scan was in  and she had osteopenia at the low back.  She has been taking a half a Tums with 2000 IUs of vitamin D daily.      GYNECOLOGIC HISTORY:    No LMP recorded (lmp unknown). Patient is postmenopausal.    Her current contraception method is: menopause.  She  reports that she has never smoked. She has never used smokeless tobacco.    Patient is sexually active.  STD testing offered?  Declined  Last PHQ-9 score on record =   PHQ-9 SCORE 2022   PHQ-9 Total Score 0     Last GAD7 score on record =   PILO-7 SCORE 2022   Total Score 0     Alcohol Score = 1    HEALTH MAINTENANCE:  Cholesterol: (No results found for: CHOL   Last Mammo: 21, Result: Normal, Next Mammo: Today   Pap: (  Lab Results   Component Value Date    GYNINTERP  2021     Negative for Intraepithelial Lesion or Malignancy (NILM)    PAP NIL 10/21/2020    PAP NIL 2019    PAP NIL 2018     Colonoscopy:  2015, Result: Normal, Next Colonoscopy:    Dexa:  10/21/2020    Health maintenance updated:  yes    HISTORY:  OB History    Para Term  AB Living   3 2 2 0 1 2   SAB IAB Ectopic Multiple Live Births   1 0 0 0 2      # Outcome Date GA Lbr Rosendo/2nd Weight Sex Delivery Anes PTL Lv   3 SAB      AB, COMPLETE      2 Term      CS-LTranv   RAFITA   1 Term         RAFITA       Patient Active Problem List   Diagnosis     ASCUS of cervix with negative high risk HPV     Past Surgical History:   Procedure Laterality Date  "    BACK SURGERY      L5-S1 laminectomy     DILATION AND CURETTAGE, HYSTEROSCOPY, ABLATE ENDOMETRIUM VERSAPOINT, COMBINED N/A 7/25/2017    Procedure: COMBINED DILATION AND CURETTAGE, HYSTEROSCOPY, ABLATE ENDOMETRIUM VERSAPOINT;  DILATION AND CURETTAGE, HYSTEROSCOPY, POLYPECTOMY, ENDOMETRIAL ABLATION WITH VERSAPOINT;  Surgeon: Perry Urbina MD;  Location: [facility]     MAMMOPLASTY REDUCTION BILATERAL  2004     TONSILLECTOMY        Social History     Tobacco Use     Smoking status: Never     Smokeless tobacco: Never   Substance Use Topics     Alcohol use: Yes     Comment: rarely      Problem (# of Occurrences) Relation (Name,Age of Onset)    Myocardial Infarction (1) Father: x2    Stomach Cancer (1) Sister (49): dx'd and passed away 2018       Negative family history of: Diabetes            Current Outpatient Medications   Medication Sig     calcium carbonate (TUMS) 500 MG chewable tablet Take 1 chew tab by mouth 2 times daily     furosemide (LASIX) 20 MG tablet Take 20 mg by mouth     loratadine (CLARITIN) 10 MG tablet Take 10 mg by mouth     Nutritional Supplements (VITAMIN D BOOSTER PO)      pantoprazole (PROTONIX) 40 MG EC tablet TAKE 1 TABLET BY MOUTH EVERY DAY     vitamin B-12 (CYANOCOBALAMIN) 1000 MCG tablet Take 1,000 mcg by mouth     No current facility-administered medications for this visit.     Allergies   Allergen Reactions     Molds & Smuts Other (See Comments)     Per allergy testing      Sulfa Drugs Hives       Past medical, surgical, social and family histories were reviewed and updated in EPIC.    ROS:   12 point review of systems negative other than symptoms noted below or in the HPI.  Genitourinary: Incontinence  POSITIVE for:, stress incontinence    EXAM:  /78   Ht 1.549 m (5' 1\")   Wt 76.7 kg (169 lb)   LMP  (LMP Unknown)   BMI 31.93 kg/m     BMI: Body mass index is 31.93 kg/m .    PHYSICAL EXAM:  Constitutional:   Appearance: Well nourished, well developed, alert, in no acute " distress  Neck:  Lymph Nodes:  No lymphadenopathy present    Thyroid:  Gland size normal, nontender, no nodules or masses present  on palpation  Chest:  Respiratory Effort:  Breathing unlabored  Cardiovascular:    Heart: Auscultation:  Regular rate, normal rhythm, no murmurs present  Breasts: Inspection of Breasts:  No lymphadenopathy present., Palpation of Breasts and Axillae:  No masses present on palpation, no breast tenderness., Axillary Lymph Nodes:  No lymphadenopathy present., No nodularity, asymmetry or nipple discharge bilaterally. and Bilateral breast reduction scars  Gastrointestinal:   Abdominal Examination:  Abdomen nontender to palpation, tone normal without rigidity or guarding, no masses present, umbilicus without lesions   Liver and Spleen:  No hepatomegaly present, liver nontender to palpation    Hernias:  No hernias present  Lymphatic: Lymph Nodes:  No other lymphadenopathy present  Skin:  General Inspection:  No rashes present, no lesions present, no areas of  discoloration  Neurologic:    Mental Status:  Oriented X3.  Normal strength and tone, sensory exam                grossly normal, mentation intact and speech normal.    Psychiatric:   Mentation appears normal and affect normal/bright.         Pelvic Exam:  External Genitalia:     Normal appearance for age, no discharge present, no tenderness present, no inflammatory lesions present, color normal  Vagina:     Normal vaginal vault without central or paravaginal defects, no discharge present, no inflammatory lesions present, no masses present  Bladder:     Nontender to palpation  Urethra:   Urethral Body:  Urethra palpation normal, urethra structural support normal   Urethral Meatus:  No erythema or lesions present  Cervix:     Appearance healthy, no lesions present, nontender to palpation, no bleeding present  Uterus:     Uterus: firm, normal sized and nontender, anteverted in position.   Adnexa:     No adnexal tenderness present, no adnexal  masses present  Perineum:     Perineum within normal limits, no evidence of trauma, no rashes or skin lesions present  Anus:     Anus within normal limits, no hemorrhoids present  Inguinal Lymph Nodes:     No lymphadenopathy present  Pubic Hair:     Normal pubic hair distribution for age  Genitalia and Groin:     No rashes present, no lesions present, no areas of discoloration, no masses present      COUNSELING:   Special attention given to:        Regular exercise       Healthy diet/nutrition       Osteoporosis prevention/bone health    BMI: Body mass index is 31.93 kg/m .  Weight management plan: Discussed healthy diet and exercise guidelines    ASSESSMENT:  62 year old female with satisfactory annual exam.    ICD-10-CM    1. Encounter for gynecological examination without abnormal finding  Z01.419       2. Special screening for osteoporosis  Z13.820 DX Hip/Pelvis/Spine      3. Stress incontinence of urine  N39.3           PLAN:  62-year-old postmenopausal female with some loss of angle in the bladder with a Valsalva maneuver.  We encouraged her to practice pelvic floor strengthening with Pilates.  Pap smear was not collected.  We will collect every other year until age 66.  She is to complete a DEXA scan next year.  She is to continue with annual mammograms.    KARIME Mittal CNP

## 2022-12-16 ENCOUNTER — OFFICE VISIT (OUTPATIENT)
Dept: OBGYN | Facility: CLINIC | Age: 62
End: 2022-12-16
Payer: COMMERCIAL

## 2022-12-16 ENCOUNTER — ANCILLARY PROCEDURE (OUTPATIENT)
Dept: MAMMOGRAPHY | Facility: CLINIC | Age: 62
End: 2022-12-16
Payer: COMMERCIAL

## 2022-12-16 VITALS
HEIGHT: 61 IN | DIASTOLIC BLOOD PRESSURE: 78 MMHG | WEIGHT: 169 LBS | BODY MASS INDEX: 31.91 KG/M2 | SYSTOLIC BLOOD PRESSURE: 120 MMHG

## 2022-12-16 DIAGNOSIS — N39.3 STRESS INCONTINENCE OF URINE: ICD-10-CM

## 2022-12-16 DIAGNOSIS — Z13.820 SPECIAL SCREENING FOR OSTEOPOROSIS: ICD-10-CM

## 2022-12-16 DIAGNOSIS — Z01.419 ENCOUNTER FOR GYNECOLOGICAL EXAMINATION WITHOUT ABNORMAL FINDING: Primary | ICD-10-CM

## 2022-12-16 DIAGNOSIS — Z12.31 VISIT FOR SCREENING MAMMOGRAM: ICD-10-CM

## 2022-12-16 PROCEDURE — 77067 SCR MAMMO BI INCL CAD: CPT | Mod: TC | Performed by: STUDENT IN AN ORGANIZED HEALTH CARE EDUCATION/TRAINING PROGRAM

## 2022-12-16 PROCEDURE — 99396 PREV VISIT EST AGE 40-64: CPT | Performed by: NURSE PRACTITIONER

## 2022-12-16 PROCEDURE — 77063 BREAST TOMOSYNTHESIS BI: CPT | Mod: TC | Performed by: STUDENT IN AN ORGANIZED HEALTH CARE EDUCATION/TRAINING PROGRAM

## 2022-12-16 ASSESSMENT — ANXIETY QUESTIONNAIRES
2. NOT BEING ABLE TO STOP OR CONTROL WORRYING: NOT AT ALL
7. FEELING AFRAID AS IF SOMETHING AWFUL MIGHT HAPPEN: NOT AT ALL
1. FEELING NERVOUS, ANXIOUS, OR ON EDGE: NOT AT ALL
IF YOU CHECKED OFF ANY PROBLEMS ON THIS QUESTIONNAIRE, HOW DIFFICULT HAVE THESE PROBLEMS MADE IT FOR YOU TO DO YOUR WORK, TAKE CARE OF THINGS AT HOME, OR GET ALONG WITH OTHER PEOPLE: NOT DIFFICULT AT ALL
GAD7 TOTAL SCORE: 0
5. BEING SO RESTLESS THAT IT IS HARD TO SIT STILL: NOT AT ALL
6. BECOMING EASILY ANNOYED OR IRRITABLE: NOT AT ALL
3. WORRYING TOO MUCH ABOUT DIFFERENT THINGS: NOT AT ALL
GAD7 TOTAL SCORE: 0

## 2022-12-16 ASSESSMENT — PATIENT HEALTH QUESTIONNAIRE - PHQ9
5. POOR APPETITE OR OVEREATING: NOT AT ALL
SUM OF ALL RESPONSES TO PHQ QUESTIONS 1-9: 0

## 2023-08-24 ENCOUNTER — OFFICE VISIT (OUTPATIENT)
Dept: OBGYN | Facility: CLINIC | Age: 63
End: 2023-08-24
Payer: COMMERCIAL

## 2023-08-24 VITALS
WEIGHT: 173 LBS | DIASTOLIC BLOOD PRESSURE: 74 MMHG | BODY MASS INDEX: 32.66 KG/M2 | HEIGHT: 61 IN | SYSTOLIC BLOOD PRESSURE: 118 MMHG

## 2023-08-24 DIAGNOSIS — N81.6 RECTOCELE: ICD-10-CM

## 2023-08-24 DIAGNOSIS — N95.0 PMB (POSTMENOPAUSAL BLEEDING): Primary | ICD-10-CM

## 2023-08-24 DIAGNOSIS — N81.11 CYSTOCELE, MIDLINE: ICD-10-CM

## 2023-08-24 PROCEDURE — 99213 OFFICE O/P EST LOW 20 MIN: CPT | Performed by: NURSE PRACTITIONER

## 2023-08-24 ASSESSMENT — ANXIETY QUESTIONNAIRES
1. FEELING NERVOUS, ANXIOUS, OR ON EDGE: MORE THAN HALF THE DAYS
6. BECOMING EASILY ANNOYED OR IRRITABLE: NOT AT ALL
5. BEING SO RESTLESS THAT IT IS HARD TO SIT STILL: NOT AT ALL
7. FEELING AFRAID AS IF SOMETHING AWFUL MIGHT HAPPEN: SEVERAL DAYS
2. NOT BEING ABLE TO STOP OR CONTROL WORRYING: SEVERAL DAYS
GAD7 TOTAL SCORE: 5
3. WORRYING TOO MUCH ABOUT DIFFERENT THINGS: SEVERAL DAYS
GAD7 TOTAL SCORE: 5
IF YOU CHECKED OFF ANY PROBLEMS ON THIS QUESTIONNAIRE, HOW DIFFICULT HAVE THESE PROBLEMS MADE IT FOR YOU TO DO YOUR WORK, TAKE CARE OF THINGS AT HOME, OR GET ALONG WITH OTHER PEOPLE: SOMEWHAT DIFFICULT

## 2023-08-24 ASSESSMENT — PATIENT HEALTH QUESTIONNAIRE - PHQ9
5. POOR APPETITE OR OVEREATING: NOT AT ALL
SUM OF ALL RESPONSES TO PHQ QUESTIONS 1-9: 3

## 2023-08-24 NOTE — PROGRESS NOTES
SUBJECTIVE:                                                   Wendy J Goltzman is a 62 year old female who presents to clinic today for the following health issue(s):  Patient presents with:  Vaginal Problem: C/o pressure in vaginal area    HPI:  Patient here today with concerns of vaginal pressure.  She states that her symptoms have become more noticeable in the last couple of months.  She does not suffer from constipation.  She had some minor cystocele symptoms on her exam at her annual in December.    She will occasionally use the bathroom and then go back after 10 minutes to ensure complete emptying.    She self assessed the vaginal area last week and noticed a salmon-colored discharge.    No LMP recorded (lmp unknown). Patient is postmenopausal.    Patient is not sexually active, .  Using not sexually active for contraception.    reports that she has never smoked. She has never used smokeless tobacco.    STD testing offered?  Declined    Health maintenance updated:  yes    Today's PHQ-2 Score:       2023     2:58 PM   PHQ-2 (  [employer])   Q1: Little interest or pleasure in doing things 0   Q2: Feeling down, depressed or hopeless 1   PHQ-2 Score 1     Today's PHQ-9 Score:       2023     2:58 PM   PHQ-9 SCORE   PHQ-9 Total Score 3     Today's PILO-7 Score:       2023     2:58 PM   PILO-7 SCORE   Total Score 5       Problem list and histories reviewed & adjusted, as indicated.  Additional history: as documented.    Patient Active Problem List   Diagnosis    ASCUS of cervix with negative high risk HPV     Past Surgical History:   Procedure Laterality Date    BACK SURGERY      L5-S1 laminectomy    DILATION AND CURETTAGE, HYSTEROSCOPY, ABLATE ENDOMETRIUM VERSAPOINT, COMBINED N/A 2017    Procedure: COMBINED DILATION AND CURETTAGE, HYSTEROSCOPY, ABLATE ENDOMETRIUM VERSAPOINT;  DILATION AND CURETTAGE, HYSTEROSCOPY, POLYPECTOMY, ENDOMETRIAL ABLATION WITH VERSAPOINT;  Surgeon: Perry Urbina  "MD CARLOS;  Location: [facility]    MAMMOPLASTY REDUCTION BILATERAL  2004    TONSILLECTOMY        Social History     Tobacco Use    Smoking status: Never    Smokeless tobacco: Never   Substance Use Topics    Alcohol use: Yes     Comment: rarely      Problem (# of Occurrences) Relation (Name,Age of Onset)    Myocardial Infarction (1) Father: x2    Stomach Cancer (1) Sister (49): dx'd and passed away 2018           Negative family history of: Diabetes              Current Outpatient Medications   Medication Sig    calcium carbonate (TUMS) 500 MG chewable tablet Take 1 chew tab by mouth 2 times daily    furosemide (LASIX) 20 MG tablet Take 20 mg by mouth    loratadine (CLARITIN) 10 MG tablet Take 10 mg by mouth    Nutritional Supplements (VITAMIN D BOOSTER PO)     pantoprazole (PROTONIX) 40 MG EC tablet TAKE 1 TABLET BY MOUTH EVERY DAY    vitamin B-12 (CYANOCOBALAMIN) 1000 MCG tablet Take 1,000 mcg by mouth     No current facility-administered medications for this visit.     Allergies   Allergen Reactions    Molds & Smuts Other (See Comments)     Per allergy testing     Sulfa Antibiotics Hives       ROS:  12 point review of systems negative other than symptoms noted below or in the HPI.  Genitourinary: vaginal pressure  No urinary frequency or dysuria, bladder or kidney problems      OBJECTIVE:     /74   Ht 1.549 m (5' 1\")   Wt 78.5 kg (173 lb)   LMP  (LMP Unknown)   BMI 32.69 kg/m    Body mass index is 32.69 kg/m .    Exam:  Constitutional:  Appearance: Well nourished, well developed alert, in no acute distress  Psychiatric:  Mentation appears normal and affect normal/bright.  Pelvic Exam:  External Genitalia:     Normal appearance for age, no discharge present, no tenderness present, no inflammatory lesions present, color normal  Vagina:     Normal vaginal vault without central or paravaginal defects, no discharge present, no inflammatory lesions present, no masses present- rectocele noted. petichea  Bladder:  "    Nontender to palpation. Cystocele noted with valsalva  Urethra:   Urethral Body:  Urethra palpation normal, urethra structural support normal   Urethral Meatus:  No erythema or lesions present  Cervix:     Appearance healthy, no lesions present, nontender to palpation, no bleeding present  Uterus:     Uterus: firm, normal sized and nontender, anteverted in position.   Adnexa:     No adnexal tenderness present, no adnexal masses present  Perineum:     Perineum within normal limits, no evidence of trauma, no rashes or skin lesions present  Anus:     Anus within normal limits, no hemorrhoids present  Inguinal Lymph Nodes:     No lymphadenopathy present  Pubic Hair:     Normal pubic hair distribution for age  Genitalia and Groin:     No rashes present, no lesions present, no areas of discoloration, no masses present     In-Clinic Test Results:  No results found for this or any previous visit (from the past 24 hour(s)).    ASSESSMENT/PLAN:                                                        ICD-10-CM    1. PMB (postmenopausal bleeding)  N95.0 US Transvaginal Pelvic Non-OB      2. Cystocele, midline  N81.11 Physical Therapy Referral      3. Rectocele  N81.6 Physical Therapy Referral          There are no Patient Instructions on file for this visit.    62-year-old postmenopausal female with rectocele and cystocele on exam.  Her bleeding is likely from the petechiae that I noticed during her exam.  We will order transvaginal ultrasound just in case.  We have also recommended that she start pelvic floor therapy.    KARIME Mittal CNP  [facility] [place]

## 2023-08-28 ENCOUNTER — ANCILLARY PROCEDURE (OUTPATIENT)
Dept: ULTRASOUND IMAGING | Facility: CLINIC | Age: 63
End: 2023-08-28
Attending: NURSE PRACTITIONER
Payer: COMMERCIAL

## 2023-08-28 DIAGNOSIS — N95.0 PMB (POSTMENOPAUSAL BLEEDING): ICD-10-CM

## 2023-08-28 PROCEDURE — 76830 TRANSVAGINAL US NON-OB: CPT | Performed by: STUDENT IN AN ORGANIZED HEALTH CARE EDUCATION/TRAINING PROGRAM

## 2023-08-29 ENCOUNTER — VIRTUAL VISIT (OUTPATIENT)
Dept: OBGYN | Facility: CLINIC | Age: 63
End: 2023-08-29
Attending: NURSE PRACTITIONER
Payer: COMMERCIAL

## 2023-08-29 DIAGNOSIS — N95.0 PMB (POSTMENOPAUSAL BLEEDING): Primary | ICD-10-CM

## 2023-08-29 PROCEDURE — 99207 PR NO CHARGE LOS: CPT | Performed by: NURSE PRACTITIONER

## 2023-08-29 NOTE — PROGRESS NOTES
"Wendy J Goltzman is a 62 year old female who is being evaluated via a billable telephone visit.      What phone number would you like to be contacted at? [phone]   How would you like to obtain your AVS? Mail a copy      Originating Location (pt. Location): Home      Distant Location (provider location):  On-site      SUBJECTIVE:                                                   Wendy J Goltzman is a 62 year old female who presents for virtual visit today for the following health issue(s):  Patient presents with:  Follow Up: Ultra Sound Review     HPI:  Phone call with patient today for ultrasound evaluation of some \"salmon-colored discharge\".  When I initially made the phone call I was unable to hear the patient.  I attempted 3 times on the phone call without actually here in the patient's voice.    No LMP recorded (lmp unknown). Patient is postmenopausal..     Patient is not sexually active, .  Using menopause for contraception.    reports that she has never smoked. She has never used smokeless tobacco.      Health maintenance updated:  See Care Gaps    Today's PHQ-2 Score:       2023     2:58 PM   PHQ-2 (  [employer])   Q1: Little interest or pleasure in doing things 0   Q2: Feeling down, depressed or hopeless 1   PHQ-2 Score 1     Today's PHQ-9 Score:       2023     2:58 PM   PHQ-9 SCORE   PHQ-9 Total Score 3     Today's PILO-7 Score:       2023     2:58 PM   PILO-7 SCORE   Total Score 5       Problem list and histories reviewed & adjusted, as indicated.  Additional history: as documented.    Patient Active Problem List   Diagnosis    ASCUS of cervix with negative high risk HPV     Past Surgical History:   Procedure Laterality Date    BACK SURGERY      L5-S1 laminectomy    DILATION AND CURETTAGE, HYSTEROSCOPY, ABLATE ENDOMETRIUM VERSAPOINT, COMBINED N/A 2017    Procedure: COMBINED DILATION AND CURETTAGE, HYSTEROSCOPY, ABLATE ENDOMETRIUM VERSAPOINT;  DILATION AND CURETTAGE, " HYSTEROSCOPY, POLYPECTOMY, ENDOMETRIAL ABLATION WITH VERSAPOINT;  Surgeon: Perry Urbina MD;  Location: [facility]    MAMMOPLASTY REDUCTION BILATERAL  2004    TONSILLECTOMY        Social History     Tobacco Use    Smoking status: Never    Smokeless tobacco: Never   Substance Use Topics    Alcohol use: Yes     Comment: rarely      Problem (# of Occurrences) Relation (Name,Age of Onset)    Myocardial Infarction (1) Father: x2    Stomach Cancer (1) Sister (49): dx'd and passed away 2018           Negative family history of: Diabetes              Current Outpatient Medications   Medication Sig    calcium carbonate (TUMS) 500 MG chewable tablet Take 1 chew tab by mouth 2 times daily    furosemide (LASIX) 20 MG tablet Take 20 mg by mouth    loratadine (CLARITIN) 10 MG tablet Take 10 mg by mouth    Nutritional Supplements (VITAMIN D BOOSTER PO)     pantoprazole (PROTONIX) 40 MG EC tablet TAKE 1 TABLET BY MOUTH EVERY DAY    vitamin B-12 (CYANOCOBALAMIN) 1000 MCG tablet Take 1,000 mcg by mouth     No current facility-administered medications for this visit.     Allergies   Allergen Reactions    Molds & Smuts Other (See Comments)     Per allergy testing     Sulfa Antibiotics Hives         OBJECTIVE:     No vitals were obtained today due to virtual visit.    Physical Exam again unable to hear the patient's voice on the phone call.        ASSESSMENT/PLAN:                                                      Phone call duration: 10 minutes      ICD-10-CM    1. PMB (postmenopausal bleeding)  N95.0           There are no Patient Instructions on file for this visit.    62-year-old postmenopausal female with a simple cyst on the right ovary.   There is no evidence of why she be having the bleeding.  I did request that she repeat the US in 10 weeks-she is PM and should not have any cysts noted.     Again I was unable to hear the patient's voice during the phone call and will attempt to call her later on today.    This is a no charge  visit.     KARIME Mittal CNP  [facility] [place]

## 2023-08-31 ENCOUNTER — MYC MEDICAL ADVICE (OUTPATIENT)
Dept: OBGYN | Facility: CLINIC | Age: 63
End: 2023-08-31

## 2023-08-31 DIAGNOSIS — N95.0 PMB (POSTMENOPAUSAL BLEEDING): Primary | ICD-10-CM

## 2023-10-11 ENCOUNTER — THERAPY VISIT (OUTPATIENT)
Dept: PHYSICAL THERAPY | Facility: CLINIC | Age: 63
End: 2023-10-11
Attending: NURSE PRACTITIONER
Payer: COMMERCIAL

## 2023-10-11 DIAGNOSIS — N81.6 RECTOCELE: ICD-10-CM

## 2023-10-11 DIAGNOSIS — N81.11 CYSTOCELE, MIDLINE: ICD-10-CM

## 2023-10-11 PROCEDURE — 97161 PT EVAL LOW COMPLEX 20 MIN: CPT | Mod: GP | Performed by: PHYSICAL THERAPIST

## 2023-10-11 PROCEDURE — 97535 SELF CARE MNGMENT TRAINING: CPT | Mod: GP | Performed by: PHYSICAL THERAPIST

## 2023-10-11 PROCEDURE — 97110 THERAPEUTIC EXERCISES: CPT | Mod: GP | Performed by: PHYSICAL THERAPIST

## 2023-10-11 NOTE — PROGRESS NOTES
PHYSICAL THERAPY EVALUATION  Type of Visit: Evaluation    See electronic medical record for Abuse and Falls Screening details.    Subjective       Presenting condition or subjective complaint: Have been diagnosed with rectal ans bladder prolapse. Also some trouble holding back gas. Has had some pressure in bottom/pelvic periodically but not currently.     Date of onset:      Relevant medical history: Bladder or bowel problems; Cold or hot arm or leg; Hearing problems; High blood pressure; Incontinence; Menopause; Migraines or headaches; Neck injury; Overweight; Vision problems   Dates & types of surgery: Laminectomy ,  , breast reduction , c    Prior diagnostic imaging/testing results:       Prior therapy history for the same diagnosis, illness or injury: No        Living Environment  Social support: With a significant other or spouse   [facility]; 2-story; Basement   Stairs to enter the home: Yes 7 Is there a railing: Yes   Ramp: No   Stairs inside the home: Yes 24 Is there a railing: Yes   Help at home: None  Equipment owned:       Employment: Yes   Hobbies/Interests: Walking Treadmill or walking outside    Patient goals for therapy: Control gas, strengthen pelvic area so prolapse doesn t get worse       Objective      PELVIC EVALUATION  ADDITIONAL HISTORY:  Sex assigned at birth: Female  Gender identity: Female    Pronouns: She/Her Hers      Bladder History:  Feels bladder filling: No  Triggers for feeling of inability to wait to go to the bathroom: No    How long can you wait to urinate: Hours  Gets up at night to urinate: Yes 1  Can stop the flow of urine when urinating: Yes  Volume of urine usually released: Average   Other issues: Slow or hesitant urine stream; Trouble emptying bladder completely; Dribbling after urinating  Number of bladder infections in last 12 months:    Fluid intake per day: 120      Medications taken for bladder: No     Activities causing  urine leak: Sneeze; Other activities Random times such as holding too long on way to bathroom.  Amount of urine typically leaked: [employer] coin size on panties  Pads used to help with leaking: No        Bowel History:  Frequency of bowel movement: Almost 1 per day (may miss a day during the week  Consistency of stool: Soft-formed    Ignores the urge to defecate: No  Other bowel issues: Loss of gas; Straining to have bowel movement  Length of time spent trying to have a bowel movement: If I can t do a short strain (which I haven t done in a month) or just do it without straining I try again when I have the urge.    Sexual Function History:  Sexual orientation: Straight    Sexually active: No  Lubrication used:      Pelvic pain: Standing    Pain or difficulty with orgasms/erection/ejaculation:      State of menopause: Post-menopause (I am done with menopause)  Hormone medications: No      Are you currently pregnant: No, Number of previous pregnancies: 3, Number of deliveries: 2, If you have delivered before, did you have any of these issues during delivery: Tearing; Episiotomy;  delivery; Vaginal delivery, Have you been diagnosed with pelvic prolapse or abdominal separation: No, Do you get regular exercise: Yes, I do this type of exercise: Walking and walking treadmill, Have you tried pelvic floor strengthening exercises for 4 weeks: No, Do you have any history of trauma that is relevant to your care that you d like to share: No    Discussed reason for referral regarding pelvic health needs and external/internal pelvic floor muscle examination with patient/guardian.  Opportunity provided to ask questions and verbal consent for assessment and intervention was given.      BREATHING SYMMETRY: WNL    PELVIC EXAM  External Visual Inspection:  At rest: Normal  With voluntary pelvic floor contraction: Perineal elevation      External Digital Palpation per Perineum:   Ischiocavernosis: Unremarkable  Bulbo cavernosis:  Unremarkable  Transverse perineal: Unremarkable  Levator ani: mild TTP on R, none on L    Internal Digital Palpation:  Per Vagina:  Tone: normal  Digital Muscle Performance: P (Power): Kegel strength 2, good relaxation  E (Endurance): 3-5 seconds      Pelvic Organ Prolapse:   ~ grade 1-2 with supine bear down    ABDOMINAL ASSESSMENT    Abdominal Activation/Strength:  poor to fair TA set, needed cues to exhale vs valsalva      Assessment & Plan   CLINICAL IMPRESSIONS  Medical Diagnosis: prolapse/pelvic dysfunction    Treatment Diagnosis:     Impression/Assessment: Patient is a 63 year old female with pelvic floor complaints.  The following significant findings have been identified: Decreased strength, Decreased proprioception, Impaired sensation, Impaired muscle performance, and Decreased activity tolerance. These impairments interfere with their ability to perform self care tasks, work tasks, recreational activities, household chores, household mobility, and community mobility as compared to previous level of function.     Clinical Decision Making (Complexity):  Clinical Presentation: Stable/Uncomplicated  Clinical Presentation Rationale: based on medical and personal factors listed in PT evaluation  Clinical Decision Making (Complexity): Low complexity    PLAN OF CARE  Treatment Interventions:  Interventions: Neuromuscular Re-education, Therapeutic Activity, Therapeutic Exercise, Self-Care/Home Management    Long Term Goals     PT Goal 1  Goal Identifier: Kegel strength 4  Goal Description: for continence throughout the day/night for healthy hygeine      Frequency of Treatment:    Duration of Treatment:      Recommended Referrals to Other Professionals: Physical Therapy  Education Assessment:   Learner/Method: Patient;No Barriers to Learning    Risks and benefits of evaluation/treatment have been explained.   Patient/Family/caregiver agrees with Plan of Care.     Evaluation Time:             Signing Clinician:  Alejandro Cantor, PT

## 2023-10-27 ENCOUNTER — THERAPY VISIT (OUTPATIENT)
Dept: PHYSICAL THERAPY | Facility: CLINIC | Age: 63
End: 2023-10-27
Payer: COMMERCIAL

## 2023-10-27 DIAGNOSIS — N81.11 CYSTOCELE, MIDLINE: Primary | ICD-10-CM

## 2023-10-27 DIAGNOSIS — N81.6 RECTOCELE: ICD-10-CM

## 2023-10-27 PROCEDURE — 97110 THERAPEUTIC EXERCISES: CPT | Mod: GP | Performed by: PHYSICAL THERAPIST

## 2023-10-27 PROCEDURE — 97535 SELF CARE MNGMENT TRAINING: CPT | Mod: GP | Performed by: PHYSICAL THERAPIST

## 2023-11-03 PROBLEM — M54.12 CERVICAL RADICULITIS: Status: ACTIVE | Noted: 2017-04-26

## 2023-11-03 PROBLEM — L25.9 CONTACT DERMATITIS AND ECZEMA: Status: ACTIVE | Noted: 2021-12-09

## 2023-11-03 PROBLEM — Z86.0100 HISTORY OF COLONIC POLYPS: Status: ACTIVE | Noted: 2017-01-20

## 2023-11-03 PROBLEM — G47.30 SLEEP APNEA: Status: ACTIVE | Noted: 2017-06-27

## 2023-11-03 PROBLEM — Z00.00 PHYSICAL EXAM: Status: ACTIVE | Noted: 2017-07-12

## 2023-11-03 PROBLEM — R19.8 DIGESTIVE SYMPTOM: Status: ACTIVE | Noted: 2017-01-20

## 2023-11-03 NOTE — PROGRESS NOTES
SUBJECTIVE:                                                   Wendy J Goltzman is a 63 year old female who presents to clinic today for the following health issue(s):  Patient presents with:  Hospital F/U: US        Additional information: haven't noticed any bleeding.    HPI:  Patient here today for ultrasound evaluation of a right ovarian cyst and salmon-colored discharge.  She had an ultrasound done this summer showing a 1 cm right ovarian cyst that was simple in nature.  She has been completing pelvic floor therapy and has noticed improvement in urinary symptoms and pelvic pressure.    No LMP recorded (lmp unknown). Patient is postmenopausal..     Patient is not sexually active, .  Using menopause for contraception.    reports that she has never smoked. She has never used smokeless tobacco.    STD testing offered?  Declined - not sexually active    Health maintenance updated:  yes    Today's PHQ-2 Score:       2023     2:58 PM   PHQ-2 (  [employer])   Q1: Little interest or pleasure in doing things 0   Q2: Feeling down, depressed or hopeless 1   PHQ-2 Score 1     Today's PHQ-9 Score:       2023     2:58 PM   PHQ-9 SCORE   PHQ-9 Total Score 3     Today's PILO-7 Score:       2023     2:58 PM   PILO-7 SCORE   Total Score 5       Problem list and histories reviewed & adjusted, as indicated.  Additional history: as documented.    Patient Active Problem List   Diagnosis    ASCUS of cervix with negative high risk HPV    Cystocele, midline    Rectocele    Sleep apnea    Pure hypercholesterolemia    Psoriasis    Polyp of duodenum    Physical exam    Melena    History of colonic polyps    Hematochezia    Gastroesophageal reflux disease    Essential hypertension    Digestive symptom    Contact dermatitis and eczema    Colon polyp    Cervical radiculitis     Past Surgical History:   Procedure Laterality Date    BACK SURGERY      L5-S1 laminectomy    DILATION AND CURETTAGE, HYSTEROSCOPY, ABLATE  "ENDOMETRIUM VERSAPOINT, COMBINED N/A 7/25/2017    Procedure: COMBINED DILATION AND CURETTAGE, HYSTEROSCOPY, ABLATE ENDOMETRIUM VERSAPOINT;  DILATION AND CURETTAGE, HYSTEROSCOPY, POLYPECTOMY, ENDOMETRIAL ABLATION WITH VERSAPOINT;  Surgeon: Perry Urbina MD;  Location: [facility]    MAMMOPLASTY REDUCTION BILATERAL  2004    TONSILLECTOMY        Social History     Tobacco Use    Smoking status: Never    Smokeless tobacco: Never   Substance Use Topics    Alcohol use: Yes     Comment: rarely      Problem (# of Occurrences) Relation (Name,Age of Onset)    Myocardial Infarction (1) Father: x2    Stomach Cancer (1) Sister (49): dx'd and passed away 2018           Negative family history of: Diabetes              Current Outpatient Medications   Medication Sig    calcium carbonate (TUMS) 500 MG chewable tablet Take 1 chew tab by mouth 2 times daily    furosemide (LASIX) 20 MG tablet Take 20 mg by mouth    loratadine (CLARITIN) 10 MG tablet Take 10 mg by mouth    Nutritional Supplements (VITAMIN D BOOSTER PO)     pantoprazole (PROTONIX) 40 MG EC tablet TAKE 1 TABLET BY MOUTH EVERY DAY    vitamin B-12 (CYANOCOBALAMIN) 1000 MCG tablet Take 1,000 mcg by mouth     No current facility-administered medications for this visit.     Allergies   Allergen Reactions    Molds & Smuts Other (See Comments)     Per allergy testing     Sulfa Antibiotics Hives and Other (See Comments)       ROS:  12 point review of systems negative other than symptoms noted below or in the HPI.  No urinary frequency or dysuria, bladder or kidney problems      OBJECTIVE:     /70   Ht 1.549 m (5' 1\")   Wt 80.5 kg (177 lb 6.4 oz)   LMP  (LMP Unknown)   Breastfeeding No   BMI 33.52 kg/m    Body mass index is 33.52 kg/m .    Exam:  Constitutional:  Appearance: Well nourished, well developed alert, in no acute distress  Psychiatric:  Mentation appears normal and affect normal/bright.     In-Clinic Test Results:  Results for orders placed or performed in " visit on 11/07/23 (from the past 24 hour(s))   US Transvaginal Pelvic Non-OB    Narrative    Gynecological Ultrasound Report  Pelvic U/S - Transvaginal  [facility]  Referring Provider: Jacqueline Rhodes NP  Sonographer:  Nataliia Powers RDMS  Indication: F/U right ovarian cyst  LMP: No LMP recorded (lmp unknown). Patient is postmenopausal.  History:   Gynecological Ultrasonography:   Uterus: anteverted. Contour is smooth/regular.  Size: 5.05 x 4.37 x 2.97 cm  Endometrium: Thickness Total 2.78 mm  Findings: Normal  Right Ovary: 1.74 x 1.68 x 1.91 cm. Simple cyst #1-1 x 1.1 x .8 cm,    Simple cyst #2- .5 x .5 x .5 cm  Left Ovary: 1.71 x 1.30 x 1.19 cm. Wnl  Cul de Sac Free Fluid: No free fluid  Technique: Transvaginal Imaging performed     Impression:        The uterus and ovaries were visualized and no abnormalities were seen.  Simple ovarian cyst measuring 1cm.  Unchanged from prior imaging.  No   follow up needed.  The endometrium appeared normal.  No free pelvic fluid.    Maddie Tejeda MD       ASSESSMENT/PLAN:                                                        ICD-10-CM    1. Right ovarian cyst  N83.201      Follicle stimulating hormone          Follicle stimulating hormone          There are no Patient Instructions on file for this visit.    63-year-old postmenopausal female with a stable ultrasound.  The right ovarian cyst has not changed in size however she does have an additional small cyst measuring half a centimeter on the right side.  Uterus is unremarkable.  Left ovary is quiet.  EMS is normal.  We will check an FSH and a  for patient reassurance and see her back in December for her annual exam.  We strongly encouraged her to keep up with her pelvic floor exercises.    Jacqueline Rhodes, APRN CNP  [facility] [place]

## 2023-11-07 ENCOUNTER — ANCILLARY PROCEDURE (OUTPATIENT)
Dept: ULTRASOUND IMAGING | Facility: CLINIC | Age: 63
End: 2023-11-07
Attending: NURSE PRACTITIONER
Payer: COMMERCIAL

## 2023-11-07 ENCOUNTER — OFFICE VISIT (OUTPATIENT)
Dept: OBGYN | Facility: CLINIC | Age: 63
End: 2023-11-07
Attending: NURSE PRACTITIONER
Payer: COMMERCIAL

## 2023-11-07 VITALS
DIASTOLIC BLOOD PRESSURE: 70 MMHG | SYSTOLIC BLOOD PRESSURE: 128 MMHG | BODY MASS INDEX: 33.49 KG/M2 | HEIGHT: 61 IN | WEIGHT: 177.4 LBS

## 2023-11-07 DIAGNOSIS — N95.0 PMB (POSTMENOPAUSAL BLEEDING): ICD-10-CM

## 2023-11-07 DIAGNOSIS — N83.201 RIGHT OVARIAN CYST: Primary | ICD-10-CM

## 2023-11-07 LAB
CANCER AG125 SERPL-ACNC: 14 U/ML
FSH SERPL IRP2-ACNC: 51.2 MIU/ML

## 2023-11-07 PROCEDURE — 86304 IMMUNOASSAY TUMOR CA 125: CPT | Performed by: NURSE PRACTITIONER

## 2023-11-07 PROCEDURE — 99213 OFFICE O/P EST LOW 20 MIN: CPT | Performed by: NURSE PRACTITIONER

## 2023-11-07 PROCEDURE — 76830 TRANSVAGINAL US NON-OB: CPT | Performed by: OBSTETRICS & GYNECOLOGY

## 2023-11-07 PROCEDURE — 83001 ASSAY OF GONADOTROPIN (FSH): CPT | Performed by: NURSE PRACTITIONER

## 2023-11-07 PROCEDURE — 36415 COLL VENOUS BLD VENIPUNCTURE: CPT | Performed by: NURSE PRACTITIONER

## 2023-11-15 ENCOUNTER — THERAPY VISIT (OUTPATIENT)
Dept: PHYSICAL THERAPY | Facility: CLINIC | Age: 63
End: 2023-11-15
Payer: COMMERCIAL

## 2023-11-15 DIAGNOSIS — N81.11 CYSTOCELE, MIDLINE: Primary | ICD-10-CM

## 2023-11-15 DIAGNOSIS — N81.6 RECTOCELE: ICD-10-CM

## 2023-11-15 PROCEDURE — 97530 THERAPEUTIC ACTIVITIES: CPT | Mod: GP | Performed by: PHYSICAL THERAPIST

## 2023-11-15 PROCEDURE — 97110 THERAPEUTIC EXERCISES: CPT | Mod: 59 | Performed by: PHYSICAL THERAPIST

## 2023-11-22 ENCOUNTER — PATIENT OUTREACH (OUTPATIENT)
Dept: GASTROENTEROLOGY | Facility: CLINIC | Age: 63
End: 2023-11-22
Payer: COMMERCIAL

## 2023-12-18 ASSESSMENT — ENCOUNTER SYMPTOMS
HEADACHES: 0
FREQUENCY: 0
HEMATURIA: 0
HEARTBURN: 0
NERVOUS/ANXIOUS: 1
FEVER: 0
HEMATOCHEZIA: 0
PALPITATIONS: 0
MYALGIAS: 0
SHORTNESS OF BREATH: 0
PARESTHESIAS: 1
WEAKNESS: 0
ARTHRALGIAS: 0
NAUSEA: 0
BREAST MASS: 0
CONSTIPATION: 0
COUGH: 0
CHILLS: 1
JOINT SWELLING: 0
DIZZINESS: 0
EYE PAIN: 0
SORE THROAT: 0
DIARRHEA: 0
ABDOMINAL PAIN: 0
DYSURIA: 0

## 2023-12-19 ENCOUNTER — ANCILLARY PROCEDURE (OUTPATIENT)
Dept: MAMMOGRAPHY | Facility: CLINIC | Age: 63
End: 2023-12-19
Payer: COMMERCIAL

## 2023-12-19 ENCOUNTER — OFFICE VISIT (OUTPATIENT)
Dept: OBGYN | Facility: CLINIC | Age: 63
End: 2023-12-19
Payer: COMMERCIAL

## 2023-12-19 VITALS
WEIGHT: 177.8 LBS | SYSTOLIC BLOOD PRESSURE: 110 MMHG | DIASTOLIC BLOOD PRESSURE: 80 MMHG | BODY MASS INDEX: 32.72 KG/M2 | HEIGHT: 62 IN

## 2023-12-19 DIAGNOSIS — N81.11 CYSTOCELE, MIDLINE: ICD-10-CM

## 2023-12-19 DIAGNOSIS — Z12.31 VISIT FOR SCREENING MAMMOGRAM: ICD-10-CM

## 2023-12-19 DIAGNOSIS — N81.6 RECTOCELE: ICD-10-CM

## 2023-12-19 DIAGNOSIS — Z01.419 ENCOUNTER FOR GYNECOLOGICAL EXAMINATION WITHOUT ABNORMAL FINDING: Primary | ICD-10-CM

## 2023-12-19 PROCEDURE — 77063 BREAST TOMOSYNTHESIS BI: CPT | Mod: TC | Performed by: RADIOLOGY

## 2023-12-19 PROCEDURE — 77067 SCR MAMMO BI INCL CAD: CPT | Mod: TC | Performed by: RADIOLOGY

## 2023-12-19 PROCEDURE — 99396 PREV VISIT EST AGE 40-64: CPT | Performed by: NURSE PRACTITIONER

## 2023-12-19 ASSESSMENT — ANXIETY QUESTIONNAIRES
2. NOT BEING ABLE TO STOP OR CONTROL WORRYING: MORE THAN HALF THE DAYS
7. FEELING AFRAID AS IF SOMETHING AWFUL MIGHT HAPPEN: SEVERAL DAYS
1. FEELING NERVOUS, ANXIOUS, OR ON EDGE: MORE THAN HALF THE DAYS
5. BEING SO RESTLESS THAT IT IS HARD TO SIT STILL: NOT AT ALL
3. WORRYING TOO MUCH ABOUT DIFFERENT THINGS: SEVERAL DAYS
GAD7 TOTAL SCORE: 7
6. BECOMING EASILY ANNOYED OR IRRITABLE: NOT AT ALL
IF YOU CHECKED OFF ANY PROBLEMS ON THIS QUESTIONNAIRE, HOW DIFFICULT HAVE THESE PROBLEMS MADE IT FOR YOU TO DO YOUR WORK, TAKE CARE OF THINGS AT HOME, OR GET ALONG WITH OTHER PEOPLE: SOMEWHAT DIFFICULT
GAD7 TOTAL SCORE: 7

## 2023-12-19 ASSESSMENT — PATIENT HEALTH QUESTIONNAIRE - PHQ9
SUM OF ALL RESPONSES TO PHQ QUESTIONS 1-9: 3
5. POOR APPETITE OR OVEREATING: SEVERAL DAYS

## 2023-12-19 NOTE — PROGRESS NOTES
Brigida is a 63 year old  female who presents for annual exam.     Besides routine health maintenance, she has no other health concerns today .    HPI:  The patient's PCP is Marianela Bui MD. patient here today for her annual [facility] exam.  She also has a mammogram scheduled.  She was found to have a midline cystocele and has been completing pelvic floor therapy.  She notes much better bladder emptying as well as less back pain due to a stronger core.  She is overall very happy with her results and continues to do the work.  She has no vaginal bleeding.      GYNECOLOGIC HISTORY:    No LMP recorded (lmp unknown). Patient is postmenopausal.    Her current contraception method is: menopause.  She  reports that she has never smoked. She has never used smokeless tobacco.    Patient is not sexually active.  STD testing offered?  Declined  Last PHQ-9 score on record =       2023     2:11 PM   PHQ-9 SCORE   PHQ-9 Total Score 3     Last GAD7 score on record =       2023     2:11 PM   PILO-7 SCORE   Total Score 7     Alcohol Score = 1    HEALTH MAINTENANCE:  Cholesterol: 2023 total 202    Last Mammo: One year ago, Result: Normal, Next Mammo: Today   Pap:   Lab Results   Component Value Date    GYNINTERP  2021     Negative for Intraepithelial Lesion or Malignancy (NILM)    PAP NIL 10/21/2020    PAP NIL 2019    PAP NIL 2018     Colonoscopy:2021, Result: polyp, Next Colonoscopy: 10 years.  Dexa:  10/21/20    Health maintenance updated:  yes    HISTORY:  OB History    Para Term  AB Living   3 2 2 0 1 2   SAB IAB Ectopic Multiple Live Births   1 0 0 0 2      # Outcome Date GA Lbr Rosendo/2nd Weight Sex Delivery Anes PTL Lv   3 SAB      AB, COMPLETE      2 Term      CS-LTranv   RAFITA   1 Term         RAFITA       Patient Active Problem List   Diagnosis    ASCUS of cervix with negative high risk HPV    Cystocele, midline    Rectocele    Sleep apnea    Pure  hypercholesterolemia    Psoriasis    Polyp of duodenum    Physical exam    Melena    History of colonic polyps    Hematochezia    Gastroesophageal reflux disease    Essential hypertension    Digestive symptom    Contact dermatitis and eczema    Colon polyp    Cervical radiculitis     Past Surgical History:   Procedure Laterality Date    BACK SURGERY      L5-S1 laminectomy    DILATION AND CURETTAGE, HYSTEROSCOPY, ABLATE ENDOMETRIUM VERSAPOINT, COMBINED N/A 7/25/2017    Procedure: COMBINED DILATION AND CURETTAGE, HYSTEROSCOPY, ABLATE ENDOMETRIUM VERSAPOINT;  DILATION AND CURETTAGE, HYSTEROSCOPY, POLYPECTOMY, ENDOMETRIAL ABLATION WITH VERSAPOINT;  Surgeon: Perry Urbina MD;  Location: [facility]    MAMMOPLASTY REDUCTION BILATERAL  2004    TONSILLECTOMY        Social History     Tobacco Use    Smoking status: Never    Smokeless tobacco: Never   Substance Use Topics    Alcohol use: Yes     Comment: rarely      Problem (# of Occurrences) Relation (Name,Age of Onset)    Myocardial Infarction (1) Father: x2    Stomach Cancer (1) Sister (49): dx'd and passed away 2018           Negative family history of: Diabetes              Current Outpatient Medications   Medication Sig    calcium carbonate (TUMS) 500 MG chewable tablet Take 1 chew tab by mouth 2 times daily    furosemide (LASIX) 20 MG tablet Take 20 mg by mouth    loratadine (CLARITIN) 10 MG tablet Take 10 mg by mouth    Nutritional Supplements (VITAMIN D BOOSTER PO)     pantoprazole (PROTONIX) 40 MG EC tablet TAKE 1 TABLET BY MOUTH EVERY DAY    vitamin B-12 (CYANOCOBALAMIN) 1000 MCG tablet Take 1,000 mcg by mouth     No current facility-administered medications for this visit.     Allergies   Allergen Reactions    Molds & Smuts Other (See Comments)     Per allergy testing     Sulfa Antibiotics Hives and Other (See Comments)       Past medical, surgical, social and family histories were reviewed and updated in EPIC.    ROS:   12 point review of systems negative other  "than symptoms noted below or in the HPI.  No urinary frequency or dysuria, bladder or kidney problems    EXAM:  /80   Ht 1.568 m (5' 1.75\")   Wt 80.6 kg (177 lb 12.8 oz)   LMP  (LMP Unknown)   Breastfeeding No   BMI 32.78 kg/m     BMI: Body mass index is 32.78 kg/m .    PHYSICAL EXAM:  Constitutional:   Appearance: Well nourished, well developed, alert, in no acute distress  Neck:  Lymph Nodes:  No lymphadenopathy present    Thyroid:  Gland size normal, nontender, no nodules or masses present  on palpation  Chest:  Respiratory Effort:  Breathing unlabored  Cardiovascular:    Heart: Auscultation:  Regular rate, normal rhythm, no murmurs present  Breasts: Inspection of Breasts:  No lymphadenopathy present., Palpation of Breasts and Axillae:  No masses present on palpation, no breast tenderness., Axillary Lymph Nodes:  No lymphadenopathy present., and No nodularity, asymmetry or nipple discharge bilaterally.  Gastrointestinal:   Abdominal Examination:  Abdomen nontender to palpation, tone normal without rigidity or guarding, no masses present, umbilicus without lesions   Liver and Spleen:  No hepatomegaly present, liver nontender to palpation    Hernias:  No hernias present  Lymphatic: Lymph Nodes:  No other lymphadenopathy present  Skin:  General Inspection:  No rashes present, no lesions present, no areas of  discoloration  Neurologic:    Mental Status:  Oriented X3.  Normal strength and tone, sensory exam                grossly normal, mentation intact and speech normal.    Psychiatric:   Mentation appears normal and affect normal/bright.         Pelvic Exam:  External Genitalia:     Normal appearance for age, no discharge present, no tenderness present, no inflammatory lesions present, color normal  Vagina:     Normal vaginal vault without central or paravaginal defects, no discharge present, no inflammatory lesions present, no masses present.  Good Kegel response  Bladder:     Nontender to palpation.  " Good support  Urethra:   Urethral Body:  Urethra palpation normal, urethra structural support normal   Urethral Meatus:  No erythema or lesions present  Cervix:     Appearance healthy, no lesions present, nontender to palpation, no bleeding present  Uterus:     Uterus: firm, normal sized and nontender, anteverted in position.   Adnexa:     No adnexal tenderness present, no adnexal masses present  Perineum:     Perineum within normal limits, no evidence of trauma, no rashes or skin lesions present  Anus:     Anus within normal limits, no hemorrhoids present  Inguinal Lymph Nodes:     No lymphadenopathy present  Pubic Hair:     Normal pubic hair distribution for age  Genitalia and Groin:     No rashes present, no lesions present, no areas of discoloration, no masses present    COUNSELING:   Special attention given to:        Regular exercise       Healthy diet/nutrition       Colorectal Cancer Screening       (Ernestina)menopause management    BMI: Body mass index is 32.78 kg/m .  Weight management plan: Discussed healthy diet and exercise guidelines    ASSESSMENT:  63 year old female with satisfactory annual exam.    ICD-10-CM    1. Encounter for gynecological examination without abnormal finding  Z01.419       2. Rectocele  N81.6       3. Cystocele, midline  N81.11           PLAN:  63-year-old postmenopausal female with a normal GYN exam.  She is doing very well in physical therapy and we have encouraged her to continue.  Pap smear is up-to-date.  She is to continue with annual mammograms.  She is to call with any vaginal bleeding.    KARIME Mittal CNP    Answers submitted by the patient for this visit:  Annual Preventive Visit (Submitted on 12/18/2023)  Chief Complaint: Annual Exam:  Frequency of exercise:: 6-7 days/week  Getting at least 3 servings of Calcium per day:: Yes  Diet:: Breakfast skipped  Taking medications regularly:: Yes  Bi-annual eye exam:: Yes  Dental care twice a year:: Yes  Sleep apnea or  symptoms of sleep apnea:: Sleep apnea  abdominal pain: No  Blood in stool: No  Blood in urine: No  chest pain: No  chills: Yes  congestion: No  constipation: No  cough: No  diarrhea: No  dizziness: No  ear pain: No  eye pain: No  nervous/anxious: Yes  fever: No  frequency: No  genital sores: No  headaches: No  hearing loss: No  heartburn: No  arthralgias: No  joint swelling: No  peripheral edema: No  mood changes: No  myalgias: No  nausea: No  dysuria: No  palpitations: No  Skin sensation changes: Yes  sore throat: No  urgency: No  rash: No  shortness of breath: No  visual disturbance: No  weakness: No  pelvic pain: No  vaginal bleeding: No  vaginal discharge: No  tenderness: No  breast mass: No  breast discharge: No  Additional concerns today:: No  Exercise outside of work (Submitted on 12/18/2023)  Chief Complaint: Annual Exam:  Duration of exercise:: Greater than 60 minutes

## 2024-01-29 ENCOUNTER — THERAPY VISIT (OUTPATIENT)
Dept: PHYSICAL THERAPY | Facility: CLINIC | Age: 64
End: 2024-01-29
Payer: COMMERCIAL

## 2024-01-29 DIAGNOSIS — N81.11 CYSTOCELE, MIDLINE: Primary | ICD-10-CM

## 2024-01-29 DIAGNOSIS — N81.6 RECTOCELE: ICD-10-CM

## 2024-01-29 PROCEDURE — 97530 THERAPEUTIC ACTIVITIES: CPT | Mod: GP | Performed by: PHYSICAL THERAPIST

## 2024-01-29 PROCEDURE — 97110 THERAPEUTIC EXERCISES: CPT | Mod: 59 | Performed by: PHYSICAL THERAPIST

## 2024-01-29 PROCEDURE — 97112 NEUROMUSCULAR REEDUCATION: CPT | Mod: 59 | Performed by: PHYSICAL THERAPIST

## 2024-01-30 NOTE — PROGRESS NOTES
01/29/24 0500   Appointment Info   Signing clinician's name / credentials Apoorva Vargas, PT, OCS   Total/Authorized Visits EPIC 08/24/23   Visits Used 4   Medical Diagnosis prolapse/pelvic dysfunction   Other pertinent information [email or web address]   Quick Adds Pelvic Consent   Progress Note/Certification   Progress Note Completed Date 01/29/24   GOALS   PT Goals 2   PT Goal 1   Goal Identifier Kegel strength 4   Goal Description for continence throughout the day/night for healthy hygeine   Goal Progress kegel strength 3, no prolapse noted today with supine bear down   Target Date 03/11/24   Subjective Report   Subjective Report Has not been able to get in to PT for awhile due to Covid and some life difficulties loss of mother in law/break-ins.However, has kept up with HEP. Better with urgency, Hasn't had any instances of leaking with sitting up. Less having to work on urge suppression. Feels like not as much control with passing gas. Most of the time can tell if just gas.   Objective Measures   Objective Measures Objective Measure 1   Objective Measure 1   Objective Measure kegel strength 3, no prolapse noted today with supine bear down   Treatment Interventions (PT)   Interventions Therapeutic Procedure/Exercise;Therapeutic Activity;Neuromuscular Re-education   Therapeutic Procedure/Exercise   Therapeutic Procedures: strength, endurance, ROM, flexibillity minutes (83046) 15   Ther Proc 1 Standing Pelvic Floor with Transverse Abdominus   Ther Proc 1 - Details 5 sec x 20 with exhale/TA/kegel   PTRx Ther Proc 1 Roll Ins   PTRx Ther Proc 1 - Details No Notes   PTRx Ther Proc 2 Roll Outs   PTRx Ther Proc 2 - Details No Notes   Skilled Intervention to improve PF/core strength   Patient Response/Progress needed minor cues to avoid valsalva   PTRx Ther Proc 3 Standing Plie Squat    PTRx Ther Proc 3 - Details 1x10-15 5 days/wk   PTRx Ther Proc 4 Pelvic Floor Muscle Strengthening Elevator    PTRx Ther Proc 4 - Details  1x10-15reps 5 days/wk   Therapeutic Activity   Therapeutic Activities: dynamic activities to improve functional performance minutes (24474) 15   Ther Act 1 Exhale with lifting/carrying   Skilled Intervention to decrease IAP   Patient Response/Progress good understanding to manage pressure   Neuromuscular Re-education   PTRx Neuro Re-ed 1 Standing Pelvic Floor with Transverse Abdominus   PTRx Neuro Re-ed 1 - Details 5 sec x 15-20  (Simultaneous filing. User may not have seen previous data.)   Neuromuscular re-ed of mvmt, balance, coord, kinesthetic sense, posture, proprioception minutes (11139) 8   Neuro Re-ed 1 cues with kegels to focus on back/rectal muscles vs just vaginal. monitor.   Self Care/home Management   Self Care Self Care 2   Self Care 1 educated in POC and normal pelvic floor anatomy/function   Self Care 1 - Details normal bladder/bowel function   Self Care 2 urge suppression techniques. rev'd   Intervention (Other)   PTRx Other  1 Urge Incontinence Suppression Techniques   PTRx Other 1 - Details No Notes   Education   Learner/Method Patient;No Barriers to Learning   Plan   Home program PTRX HEP   Plan for next session assess urge suppression   Comments   Pelvic Health Informed Consent Statement Discussed with patient/guardian reason for referral regarding pelvic health needs and external/internal pelvic floor muscle examination.  Opportunity provided to ask questions and verbal consent for assessment and intervention was given.   Total Session Time   Timed Code Treatment Minutes 38   Total Treatment Time (sum of timed and untimed services) 38         PLAN  Continue therapy per current plan of care.    Beginning/End Dates of Progress Note Reporting Period:  01/29/24 to 01/29/2024    Referring Provider:  Jacqueline Rhodes

## 2024-03-07 ENCOUNTER — THERAPY VISIT (OUTPATIENT)
Dept: PHYSICAL THERAPY | Facility: CLINIC | Age: 64
End: 2024-03-07
Payer: COMMERCIAL

## 2024-03-07 DIAGNOSIS — N81.11 CYSTOCELE, MIDLINE: Primary | ICD-10-CM

## 2024-03-07 DIAGNOSIS — N81.6 RECTOCELE: ICD-10-CM

## 2024-03-07 PROCEDURE — 97112 NEUROMUSCULAR REEDUCATION: CPT | Mod: GP | Performed by: PHYSICAL THERAPIST

## 2024-03-07 PROCEDURE — 97110 THERAPEUTIC EXERCISES: CPT | Mod: GP | Performed by: PHYSICAL THERAPIST

## 2024-03-08 PROBLEM — N81.11 CYSTOCELE, MIDLINE: Status: RESOLVED | Noted: 2023-10-11 | Resolved: 2024-03-08

## 2024-03-08 PROBLEM — N81.6 RECTOCELE: Status: RESOLVED | Noted: 2023-10-11 | Resolved: 2024-03-08

## 2024-03-08 NOTE — PROGRESS NOTES
03/07/24 0500   Appointment Info   Signing clinician's name / credentials Apoorva Vargas, PT, OCS   Total/Authorized Visits EPIC 08/24/23   Visits Used 5   Medical Diagnosis prolapse/pelvic dysfunction   Other pertinent information [email or web address]   Quick Adds Pelvic Consent   Progress Note/Certification   Progress Note Completed Date 03/07/24   GOALS   PT Goals 2   PT Goal 1   Goal Identifier Kegel strength 4   Goal Description for continence throughout the day/night for healthy hygeine   Goal Progress kegel strength 3, no prolapse noted today with supine bear down   Target Date 03/11/24   Subjective Report   Subjective Report Bladder is doing great. No leaking, urgency at all. Trouble holding gas, 1 episode of bowel incontinence. Aware of it b/c had strong urge to have BM. Tried to delay a bit b/c was working on eEbix. Tablespoon amount. Soft-formed.HEP regularly.   Objective Measures   Objective Measures Objective Measure 1   Objective Measure 1   Objective Measure kegel strength 3, no prolapse noted today with supine bear down   Treatment Interventions (PT)   Interventions Therapeutic Procedure/Exercise;Neuromuscular Re-education   Therapeutic Procedure/Exercise   Therapeutic Procedures: strength, endurance, ROM, flexibility minutes (24767) 15   Ther Proc 1 Standing Pelvic Floor with Transverse Abdominus   Ther Proc 1 - Details 5 sec x 20 with exhale/TA/kegel   PTRx Ther Proc 1 Roll Ins   PTRx Ther Proc 1 - Details continnue 3-4 x week   PTRx Ther Proc 2 Roll Outs   PTRx Ther Proc 2 - Details continue 3-4 x week   PTRx Ther Proc 3 Standing Plie Squat    PTRx Ther Proc 3 - Details 1x10-15 3-4 days/wk   PTRx Ther Proc 4 Pelvic Floor Muscle Strengthening Elevator    PTRx Ther Proc 4 - Details 1x10-15reps 3-4 x days/wk   Skilled Intervention to improve PF/core strength   Patient Response/Progress good understanding   Therapeutic Activity   Ther Act 1 Exhale with lifting/carrying   Skilled Intervention to  decrease IAP   Patient Response/Progress good understanding to manage pressure   Neuromuscular Re-education   Neuromuscular re-ed of mvmt, balance, coord, kinesthetic sense, posture, proprioception minutes (94565) 15   Neuro Re-ed 1 cues with kegels to focus on back/rectal muscles vs just vaginal. monitor.   PTRx Neuro Re-ed 1 Standing Pelvic Floor with Transverse Abdominus   PTRx Neuro Re-ed 1 - Details 5 sec x 15-20   Patient Response/Progress good demonstration/understanding   Self Care/home Management   Self Care Self Care 2   Self Care 1 educated in POC and normal pelvic floor anatomy/function   Self Care 1 - Details normal bladder/bowel function   Self Care 2 urge suppression techniques. rev'd   Intervention (Other)   PTRx Other  1 Urge Incontinence Suppression Techniques   PTRx Other 1 - Details No Notes   Education   Learner/Method Patient;No Barriers to Learning   Plan   Home program PTRX HEP   Plan for next session assess urge suppression   Comments   Pelvic Health Informed Consent Statement Discussed with patient/guardian reason for referral regarding pelvic health needs and external/internal pelvic floor muscle examination.  Opportunity provided to ask questions and verbal consent for assessment and intervention was given.   Total Session Time   Timed Code Treatment Minutes 30   Total Treatment Time (sum of timed and untimed services) 30         DISCHARGE  Reason for Discharge: patient plateaued, bladder great, still struggling with bowel/gas control. Likely rectocele. F/up with MD.

## 2024-12-05 ENCOUNTER — TELEPHONE (OUTPATIENT)
Dept: OBGYN | Facility: CLINIC | Age: 64
End: 2024-12-05
Payer: COMMERCIAL

## 2024-12-05 NOTE — TELEPHONE ENCOUNTER
[facility] Call Center    Phone Message    May a detailed message be left on voicemail: yes     Reason for Call: Order(s): Other:   Reason for requested: Patient is calling requesting if she is needing another US order for yearly check of her ovaries. She was last seen 11/2023. Patient is scheduled for her annual and mammogram on 2/13/2025 and wanted to know if she also needed to have another ultrasound. Please review and contact patient. Thank you.  Date needed: asap if needed  Provider name: Jacqueline Rhodes    Action Taken: Other: [facility]    Travel Screening: Not Applicable

## 2024-12-05 NOTE — TELEPHONE ENCOUNTER
11/7/2023 US  The uterus and ovaries were visualized and no abnormalities were seen.  Simple ovarian cyst measuring 1cm.  Unchanged from prior imaging.  No follow up needed.  The endometrium appeared normal.  No free pelvic fluid.   Maddie Tejeda MD RN called and left detailed vm - informing her last US was unchanged from previous US and NO F/up US was needed.  Unless having sx to discuss, she can call to speak to nurse.    Jacqueline Ashton RN on 12/5/2024 at 10:12 AM

## 2025-01-26 ENCOUNTER — HEALTH MAINTENANCE LETTER (OUTPATIENT)
Age: 65
End: 2025-01-26

## 2025-02-13 ENCOUNTER — OFFICE VISIT (OUTPATIENT)
Dept: OBGYN | Facility: CLINIC | Age: 65
End: 2025-02-13
Payer: COMMERCIAL

## 2025-02-13 VITALS
BODY MASS INDEX: 36.88 KG/M2 | HEIGHT: 62 IN | SYSTOLIC BLOOD PRESSURE: 120 MMHG | DIASTOLIC BLOOD PRESSURE: 80 MMHG | WEIGHT: 200.4 LBS

## 2025-02-13 DIAGNOSIS — Z13.820 SPECIAL SCREENING FOR OSTEOPOROSIS: ICD-10-CM

## 2025-02-13 DIAGNOSIS — Z01.419 ENCOUNTER FOR GYNECOLOGICAL EXAMINATION WITHOUT ABNORMAL FINDING: Primary | ICD-10-CM

## 2025-02-13 PROBLEM — K14.9 TONGUE IRRITATION: Status: ACTIVE | Noted: 2025-02-13

## 2025-02-13 RX ORDER — TRIAMCINOLONE ACETONIDE 1 MG/G
CREAM TOPICAL 2 TIMES DAILY
COMMUNITY
Start: 2024-07-25 | End: 2025-02-13

## 2025-02-13 NOTE — PROGRESS NOTES
Brigida is a 64 year old  female who presents for annual exam.     Besides routine health maintenance, she has no other health concerns today .    HPI:  The patient's PCP is Dr. Marianela Fontaine MD.    Pt here today for her annual gyn exam.   No vaginal bleeding.   Mammogram today  No HRT  Has questions about cystocele-bladder.   Has put on some weight this year-knows she needs to cut back on calories.   Pap UTD. Due next year.   Due for dexa next year as well.       Her daughter is engaged-2025 wedding in [facility]      GYNECOLOGIC HISTORY:    No LMP recorded (lmp unknown). Patient is postmenopausal.    Her current contraception method is: menopause.  She  reports that she has never smoked. She has never used smokeless tobacco.    Patient is not sexually active.  STD testing offered?  Declined  Last PHQ-9 score on record =       2023     2:11 PM   PHQ-9 SCORE   PHQ-9 Total Score 3     Last GAD7 score on record =       2023     2:11 PM   PILO-7 SCORE   Total Score 7     Alcohol Score =     HEALTH MAINTENANCE:  Cholesterol:  24  Last Mammo:  2023 , Result: Normal, Next Mammo: Today     Pap:   Lab Results   Component Value Date    GYNINTERP  2021     Negative for Intraepithelial Lesion or Malignancy (NILM)    PAP NIL 10/21/2020    PAP NIL 2019    PAP NIL 2018     Colonoscopy:  2021, Result: Normal, Next Colonoscopy: 10 years.  Dexa:  10/21/2020    Health maintenance updated:  yes    HISTORY:  OB History    Para Term  AB Living   4 2 2 0 2 2   SAB IAB Ectopic Multiple Live Births   2 0 0 0 2      # Outcome Date GA Lbr Rosendo/2nd Weight Sex Type Anes PTL Lv   4 SAB               Birth Comments: miscarriage   3 SAB      AB, COMPLETE      2 Term      CS-LTranv   RAFITA   1 Term         RAFITA       Patient Active Problem List   Diagnosis    ASCUS of cervix with negative high risk HPV    Sleep apnea    Pure hypercholesterolemia    Psoriasis    Polyp of  "duodenum    Physical exam    Melena    History of colonic polyps    Hematochezia    Gastroesophageal reflux disease    Essential hypertension    Digestive symptom    Contact dermatitis and eczema    Colon polyp    Cervical radiculitis    Tongue irritation     Past Surgical History:   Procedure Laterality Date    BACK SURGERY      L5-S1 laminectomy    DILATION AND CURETTAGE, HYSTEROSCOPY, ABLATE ENDOMETRIUM VERSAPOINT, COMBINED N/A 7/25/2017    Procedure: COMBINED DILATION AND CURETTAGE, HYSTEROSCOPY, ABLATE ENDOMETRIUM VERSAPOINT;  DILATION AND CURETTAGE, HYSTEROSCOPY, POLYPECTOMY, ENDOMETRIAL ABLATION WITH VERSAPOINT;  Surgeon: Perry Urbina MD;  Location: [facility]    MAMMOPLASTY REDUCTION BILATERAL  2004    TONSILLECTOMY        Social History     Tobacco Use    Smoking status: Never    Smokeless tobacco: Never   Substance Use Topics    Alcohol use: Yes     Comment: rarely      Problem (# of Occurrences) Relation (Name,Age of Onset)    Myocardial Infarction (1) Father: x2    Stomach Cancer (1) Sister (49): dx'd and passed away 2018           Negative family history of: Diabetes              Current Outpatient Medications   Medication Sig Dispense Refill    calcium carbonate (TUMS) 500 MG chewable tablet Take 1 chew tab by mouth 2 times daily      furosemide (LASIX) 20 MG tablet Take 20 mg by mouth      loratadine (CLARITIN) 10 MG tablet Take 10 mg by mouth      Nutritional Supplements (VITAMIN D BOOSTER PO)       pantoprazole (PROTONIX) 40 MG EC tablet TAKE 1 TABLET BY MOUTH EVERY DAY      vitamin B-12 (CYANOCOBALAMIN) 1000 MCG tablet Take 1,000 mcg by mouth       No current facility-administered medications for this visit.     Allergies   Allergen Reactions    Molds & Smuts Other (See Comments)     Per allergy testing     Sulfa Antibiotics Hives and Other (See Comments)       Past medical, surgical, social and family histories were reviewed and updated in EPIC.    EXAM:  /80   Ht 1.568 m (5' 1.75\")   Wt " 90.9 kg (200 lb 6.4 oz)   LMP  (LMP Unknown)   Breastfeeding No   BMI 36.95 kg/m     BMI: Body mass index is 36.95 kg/m .    PHYSICAL EXAM:  Constitutional:   Appearance: Well nourished, well developed, alert, in no acute distress  Breasts: Inspection of Breasts:  No lymphadenopathy present., Palpation of Breasts and Axillae:  No masses present on palpation, no breast tenderness., Axillary Lymph Nodes:  No lymphadenopathy present., and No nodularity, asymmetry or nipple discharge bilaterally.  Lymphatic: Lymph Nodes:  No other lymphadenopathy present  Skin:  General Inspection:  No rashes present, no lesions present, no areas of  discoloration  Neurologic:    Mental Status:  Oriented X3.  Normal strength and tone, sensory exam                grossly normal, mentation intact and speech normal.    Psychiatric:   Mentation appears normal and affect normal/bright.         Pelvic Exam:  External Genitalia:     Normal appearance for age, no discharge present, no tenderness present, no inflammatory lesions present, color normal  Vagina:     Normal vaginal vault without central or paravaginal defects, no discharge present, no inflammatory lesions present, no masses present-small rectocele noted. Ok kegal  Bladder:     Nontender to palpation  Urethra:   Urethral Body:  Urethra palpation normal, urethra structural support normal   Urethral Meatus:  No erythema or lesions present  Cervix:     Appearance healthy, no lesions present, nontender to palpation, no bleeding present  Uterus:     Uterus: firm, normal sized and nontender, anteverted in position.   Adnexa:     No adnexal tenderness present, no adnexal masses present  Perineum:     Perineum within normal limits, no evidence of trauma, no rashes or skin lesions present  Anus:     Anus within normal limits, no hemorrhoids present  Inguinal Lymph Nodes:     No lymphadenopathy present  Pubic Hair:     Normal pubic hair distribution for age  Genitalia and Groin:     No  rashes present, no lesions present, no areas of discoloration, no masses present    COUNSELING:   Special attention given to:        Regular exercise       Healthy diet/nutrition       Osteoporosis prevention/bone health       Colorectal Cancer Screening       (Ernestina)menopause management    BMI: Body mass index is 36.95 kg/m .  Weight management plan: Discussed healthy diet and exercise guidelines    ASSESSMENT:  64 year old female with satisfactory annual exam.    ICD-10-CM    1. Encounter for gynecological examination without abnormal finding  Z01.419       2. Special screening for osteoporosis  Z13.820 DX Bone Density          PLAN:  63 yo female with a small rectocele on exam.   Encouraged PFPT exercises. She will pick them back up daily.   Continue annual mammmos  Dexa next year with pap    KARIME Mittal CNP

## (undated) DEVICE — GLOVE PROTEXIS W/NEU-THERA 7.5  2D73TE75

## (undated) DEVICE — PACK TVT HYSTEROSCOPY SMA15HYFSE

## (undated) DEVICE — LINEN TOWEL PACK X5 5464

## (undated) DEVICE — SUCTION CANISTER MEDIVAC LINER 3000ML W/LID 65651-530

## (undated) DEVICE — TUBING IRRIG TUR Y TYPE 96" LF 6543-01

## (undated) DEVICE — SOL NACL 0.9% INJ 1000ML BAG 2B1324X

## (undated) DEVICE — TUBING SUCTION 12"X1/4" N612

## (undated) DEVICE — ESU ELEC VERSAPOINT SPRING TIP

## (undated) DEVICE — GOWN IMPERVIOUS SPECIALTY XLG/XLONG 32474

## (undated) RX ORDER — ONDANSETRON 2 MG/ML
INJECTION INTRAMUSCULAR; INTRAVENOUS
Status: DISPENSED
Start: 2017-07-25

## (undated) RX ORDER — PROPOFOL 10 MG/ML
INJECTION, EMULSION INTRAVENOUS
Status: DISPENSED
Start: 2017-07-25

## (undated) RX ORDER — DEXAMETHASONE SODIUM PHOSPHATE 4 MG/ML
INJECTION, SOLUTION INTRA-ARTICULAR; INTRALESIONAL; INTRAMUSCULAR; INTRAVENOUS; SOFT TISSUE
Status: DISPENSED
Start: 2017-07-25

## (undated) RX ORDER — HYDROCODONE BITARTRATE AND ACETAMINOPHEN 5; 325 MG/1; MG/1
TABLET ORAL
Status: DISPENSED
Start: 2017-07-25

## (undated) RX ORDER — LIDOCAINE HYDROCHLORIDE 20 MG/ML
INJECTION, SOLUTION EPIDURAL; INFILTRATION; INTRACAUDAL; PERINEURAL
Status: DISPENSED
Start: 2017-07-25

## (undated) RX ORDER — FENTANYL CITRATE 50 UG/ML
INJECTION, SOLUTION INTRAMUSCULAR; INTRAVENOUS
Status: DISPENSED
Start: 2017-07-25

## (undated) RX ORDER — KETOROLAC TROMETHAMINE 30 MG/ML
INJECTION, SOLUTION INTRAMUSCULAR; INTRAVENOUS
Status: DISPENSED
Start: 2017-07-25